# Patient Record
Sex: FEMALE | Race: WHITE | HISPANIC OR LATINO | Employment: OTHER | URBAN - METROPOLITAN AREA
[De-identification: names, ages, dates, MRNs, and addresses within clinical notes are randomized per-mention and may not be internally consistent; named-entity substitution may affect disease eponyms.]

---

## 2019-04-26 ENCOUNTER — TRANSFERRED RECORDS (OUTPATIENT)
Dept: HEALTH INFORMATION MANAGEMENT | Facility: CLINIC | Age: 29
End: 2019-04-26

## 2019-04-30 ENCOUNTER — MEDICAL CORRESPONDENCE (OUTPATIENT)
Dept: HEALTH INFORMATION MANAGEMENT | Facility: CLINIC | Age: 29
End: 2019-04-30

## 2019-07-11 ENCOUNTER — MEDICAL CORRESPONDENCE (OUTPATIENT)
Dept: HEALTH INFORMATION MANAGEMENT | Facility: CLINIC | Age: 29
End: 2019-07-11

## 2019-07-15 ENCOUNTER — TELEPHONE (OUTPATIENT)
Dept: GENETICS | Facility: CLINIC | Age: 29
End: 2019-07-15

## 2019-07-15 NOTE — TELEPHONE ENCOUNTER
"A representative from Dr. Rasmussen office in [place] left a voicemail on the referrals line asking what fax # to use to send a genetic counseling referral. Patient has multiple family members with Autism.    Called Dr. Rasmussen office back multiple times, every time the phone was answered someone said \"[facility] can you please hold\" and before I could respond I was put on hold. Waited on hold multiple times for a long period of times and no one came back on the phone.    Called patient and left a voicemail providing her with the Fax # [phone] to have the referral sent to.    There is already a referral and medical records in the chart that was sent to [phone] on 4/30/19 unsure, if this was ever addressed by the genetic counselors.  Please see scanned documents in chart.      "

## 2019-07-18 ENCOUNTER — TELEPHONE (OUTPATIENT)
Dept: CONSULT | Facility: CLINIC | Age: 29
End: 2019-07-18

## 2019-08-05 NOTE — TELEPHONE ENCOUNTER
Thea called back to schedule appointment.  Appointment is scheduled with Dr. Lock on 10/24/19.    Thanks  Chanel

## 2019-12-16 ENCOUNTER — TELEPHONE (OUTPATIENT)
Dept: CONSULT | Facility: CLINIC | Age: 29
End: 2019-12-16

## 2019-12-16 NOTE — TELEPHONE ENCOUNTER
Called Thea to discuss a question we had in preparation for her appointment with Dr. Lock in Genetics. Left my number to call me back.      Day Cohn MS, [facility]  Genetic Counseling  Genetics and Metabolism Division  [facility]   Phone: [phone]  Pager: [phone]  Email: [email or web address]

## 2020-01-22 ENCOUNTER — TELEPHONE (OUTPATIENT)
Dept: CONSULT | Facility: CLINIC | Age: 30
End: 2020-01-22

## 2020-01-22 NOTE — TELEPHONE ENCOUNTER
Called Thea RE her upcoming appointment for autism. She reports that she was diagnosed by a psychologist with autism, level 1. She agreed to email me this report. She does not have any additional medication concerns.    She reports that she has three kids with L2 and one child with L1 autism. Her son with L2 autism has a borderline IQ of 76, ODD, and PTSD. Her other children are well. None of the family have had genetic testing.    We discussed the process for genetic testing, and that we are most likely to find a genetic etiology in individuals who are more severely affected. We may therefore wish to test Thea's son first, and if positive, proceed with targeted genetic testing.    Thea was in agreement with this plan, and if Dr. Campos would like to see her son instead, she would like to reschedule the appointment to accommodate his classes. I will discuss with Dr. Campos and call Thea with a plan.      Justine Givens [facility]  Genetic Counselor   [facility]   Phone: [phone]  Pager: [phone]  Email: [email or web address]

## 2020-01-27 ENCOUNTER — TELEPHONE (OUTPATIENT)
Dept: CONSULT | Facility: CLINIC | Age: 30
End: 2020-01-27

## 2020-02-07 ENCOUNTER — TELEPHONE (OUTPATIENT)
Dept: CONSULT | Facility: CLINIC | Age: 30
End: 2020-02-07

## 2020-02-07 NOTE — TELEPHONE ENCOUNTER
Called [facility] for Thea RE scheduling her son instead of herself with Dr. Campos. We have previously talked about the benefits of this approach. Provided call center number to swap appointment. Contact info for myself provided.    Justine Givens [facility]  Genetic Counselor   [facility]   Phone: [phone]  Pager: [phone]  Email: [email or web address]

## 2022-11-12 ENCOUNTER — TRANSFERRED RECORDS (OUTPATIENT)
Dept: MULTI SPECIALTY CLINIC | Facility: CLINIC | Age: 32
End: 2022-11-12

## 2022-11-12 LAB — PAP SMEAR - HIM PATIENT REPORTED: NEGATIVE

## 2024-11-12 ENCOUNTER — OFFICE VISIT (OUTPATIENT)
Dept: FAMILY MEDICINE | Facility: CLINIC | Age: 34
End: 2024-11-12
Payer: COMMERCIAL

## 2024-11-12 VITALS
BODY MASS INDEX: 23.03 KG/M2 | OXYGEN SATURATION: 99 % | RESPIRATION RATE: 12 BRPM | HEART RATE: 74 BPM | TEMPERATURE: 98.5 F | DIASTOLIC BLOOD PRESSURE: 80 MMHG | WEIGHT: 138.2 LBS | SYSTOLIC BLOOD PRESSURE: 121 MMHG | HEIGHT: 65 IN

## 2024-11-12 DIAGNOSIS — M25.552 BILATERAL HIP PAIN: ICD-10-CM

## 2024-11-12 DIAGNOSIS — Q79.62 HYPERMOBILE EHLERS-DANLOS SYNDROME: ICD-10-CM

## 2024-11-12 DIAGNOSIS — Z00.00 ROUTINE GENERAL MEDICAL EXAMINATION AT A HEALTH CARE FACILITY: Primary | ICD-10-CM

## 2024-11-12 DIAGNOSIS — F42.2 MIXED OBSESSIONAL THOUGHTS AND ACTS: ICD-10-CM

## 2024-11-12 DIAGNOSIS — Z13.1 SCREENING FOR DIABETES MELLITUS: ICD-10-CM

## 2024-11-12 DIAGNOSIS — F84.0 AUTISM SPECTRUM DISORDER: ICD-10-CM

## 2024-11-12 DIAGNOSIS — N64.4 BREAST PAIN: ICD-10-CM

## 2024-11-12 DIAGNOSIS — G90.A POTS (POSTURAL ORTHOSTATIC TACHYCARDIA SYNDROME): ICD-10-CM

## 2024-11-12 DIAGNOSIS — M25.551 BILATERAL HIP PAIN: ICD-10-CM

## 2024-11-12 DIAGNOSIS — Z80.8 FAMILY HISTORY OF MELANOMA: ICD-10-CM

## 2024-11-12 DIAGNOSIS — Z13.220 SCREENING, LIPID: ICD-10-CM

## 2024-11-12 DIAGNOSIS — F90.9 ATTENTION DEFICIT HYPERACTIVITY DISORDER (ADHD), UNSPECIFIED ADHD TYPE: ICD-10-CM

## 2024-11-12 DIAGNOSIS — R11.0 NAUSEA: ICD-10-CM

## 2024-11-12 LAB
ANION GAP SERPL CALCULATED.3IONS-SCNC: 11 MMOL/L (ref 7–15)
BASOPHILS # BLD AUTO: 0.1 10E3/UL (ref 0–0.2)
BASOPHILS NFR BLD AUTO: 1 %
BUN SERPL-MCNC: 10 MG/DL (ref 6–20)
CALCIUM SERPL-MCNC: 9 MG/DL (ref 8.8–10.4)
CHLORIDE SERPL-SCNC: 104 MMOL/L (ref 98–107)
CHOLEST SERPL-MCNC: 172 MG/DL
CREAT SERPL-MCNC: 0.77 MG/DL (ref 0.51–0.95)
EGFRCR SERPLBLD CKD-EPI 2021: >90 ML/MIN/1.73M2
EOSINOPHIL # BLD AUTO: 0.2 10E3/UL (ref 0–0.7)
EOSINOPHIL NFR BLD AUTO: 2 %
ERYTHROCYTE [DISTWIDTH] IN BLOOD BY AUTOMATED COUNT: 12.3 % (ref 10–15)
FASTING STATUS PATIENT QL REPORTED: YES
FASTING STATUS PATIENT QL REPORTED: YES
GLUCOSE SERPL-MCNC: 87 MG/DL (ref 70–99)
HCO3 SERPL-SCNC: 24 MMOL/L (ref 22–29)
HCT VFR BLD AUTO: 39.5 % (ref 35–47)
HDLC SERPL-MCNC: 92 MG/DL
HGB BLD-MCNC: 13 G/DL (ref 11.7–15.7)
IMM GRANULOCYTES # BLD: 0 10E3/UL
IMM GRANULOCYTES NFR BLD: 0 %
LDLC SERPL CALC-MCNC: 65 MG/DL
LYMPHOCYTES # BLD AUTO: 2 10E3/UL (ref 0.8–5.3)
LYMPHOCYTES NFR BLD AUTO: 23 %
MCH RBC QN AUTO: 29.1 PG (ref 26.5–33)
MCHC RBC AUTO-ENTMCNC: 32.9 G/DL (ref 31.5–36.5)
MCV RBC AUTO: 89 FL (ref 78–100)
MONOCYTES # BLD AUTO: 0.4 10E3/UL (ref 0–1.3)
MONOCYTES NFR BLD AUTO: 5 %
NEUTROPHILS # BLD AUTO: 6.1 10E3/UL (ref 1.6–8.3)
NEUTROPHILS NFR BLD AUTO: 69 %
NONHDLC SERPL-MCNC: 80 MG/DL
PLATELET # BLD AUTO: 204 10E3/UL (ref 150–450)
POTASSIUM SERPL-SCNC: 4.1 MMOL/L (ref 3.4–5.3)
RBC # BLD AUTO: 4.46 10E6/UL (ref 3.8–5.2)
SODIUM SERPL-SCNC: 139 MMOL/L (ref 135–145)
TRIGL SERPL-MCNC: 76 MG/DL
TSH SERPL DL<=0.005 MIU/L-ACNC: 1.4 UIU/ML (ref 0.3–4.2)
WBC # BLD AUTO: 8.8 10E3/UL (ref 4–11)

## 2024-11-12 PROCEDURE — 80048 BASIC METABOLIC PNL TOTAL CA: CPT | Performed by: PHYSICIAN ASSISTANT

## 2024-11-12 PROCEDURE — 85025 COMPLETE CBC W/AUTO DIFF WBC: CPT | Performed by: PHYSICIAN ASSISTANT

## 2024-11-12 PROCEDURE — 80061 LIPID PANEL: CPT | Performed by: PHYSICIAN ASSISTANT

## 2024-11-12 PROCEDURE — 84443 ASSAY THYROID STIM HORMONE: CPT | Performed by: PHYSICIAN ASSISTANT

## 2024-11-12 PROCEDURE — 99385 PREV VISIT NEW AGE 18-39: CPT | Performed by: PHYSICIAN ASSISTANT

## 2024-11-12 PROCEDURE — 36415 COLL VENOUS BLD VENIPUNCTURE: CPT | Performed by: PHYSICIAN ASSISTANT

## 2024-11-12 PROCEDURE — 99214 OFFICE O/P EST MOD 30 MIN: CPT | Mod: 25 | Performed by: PHYSICIAN ASSISTANT

## 2024-11-12 RX ORDER — NORELGESTROMIN AND ETHINYL ESTRADIOL 35; 150 UG/MG; UG/MG
1 PATCH TRANSDERMAL WEEKLY
COMMUNITY
Start: 2024-10-08

## 2024-11-12 RX ORDER — LISDEXAMFETAMINE DIMESYLATE 30 MG/1
30 CAPSULE ORAL DAILY
Qty: 30 CAPSULE | Refills: 0 | Status: SHIPPED | OUTPATIENT
Start: 2024-12-12 | End: 2025-01-11

## 2024-11-12 RX ORDER — LISDEXAMFETAMINE DIMESYLATE 30 MG/1
30 CAPSULE ORAL DAILY
Qty: 30 CAPSULE | Refills: 0 | Status: SHIPPED | OUTPATIENT
Start: 2024-11-12 | End: 2024-12-12

## 2024-11-12 RX ORDER — LISDEXAMFETAMINE DIMESYLATE 30 MG/1
30 CAPSULE ORAL DAILY
Qty: 30 CAPSULE | Refills: 0 | Status: SHIPPED | OUTPATIENT
Start: 2025-01-11 | End: 2025-02-10

## 2024-11-12 SDOH — HEALTH STABILITY: PHYSICAL HEALTH: ON AVERAGE, HOW MANY MINUTES DO YOU ENGAGE IN EXERCISE AT THIS LEVEL?: 30 MIN

## 2024-11-12 SDOH — HEALTH STABILITY: PHYSICAL HEALTH: ON AVERAGE, HOW MANY DAYS PER WEEK DO YOU ENGAGE IN MODERATE TO STRENUOUS EXERCISE (LIKE A BRISK WALK)?: 3 DAYS

## 2024-11-12 ASSESSMENT — SOCIAL DETERMINANTS OF HEALTH (SDOH): HOW OFTEN DO YOU GET TOGETHER WITH FRIENDS OR RELATIVES?: NEVER

## 2024-11-12 NOTE — PATIENT INSTRUCTIONS
Patient Education   Preventive Care Advice   This is general advice given by our system to help you stay healthy. However, your care team may have specific advice just for you. Please talk to your care team about your preventive care needs.  Nutrition  Eat 5 or more servings of fruits and vegetables each day.  Try wheat bread, brown rice and whole grain pasta (instead of white bread, rice, and pasta).  Get enough calcium and vitamin D. Check the label on foods and aim for 100% of the RDA (recommended daily allowance).  Lifestyle  Exercise at least 150 minutes each week  (30 minutes a day, 5 days a week).  Do muscle strengthening activities 2 days a week. These help control your weight and prevent disease.  No smoking.  Wear sunscreen to prevent skin cancer.  Have a dental exam and cleaning every 6 months.  Yearly exams  See your health care team every year to talk about:  Any changes in your health.  Any medicines your care team has prescribed.  Preventive care, family planning, and ways to prevent chronic diseases.  Shots (vaccines)   HPV shots (up to age 26), if you've never had them before.  Hepatitis B shots (up to age 59), if you've never had them before.  COVID-19 shot: Get this shot when it's due.  Flu shot: Get a flu shot every year.  Tetanus shot: Get a tetanus shot every 10 years.  Pneumococcal, hepatitis A, and RSV shots: Ask your care team if you need these based on your risk.  Shingles shot (for age 50 and up)  General health tests  Diabetes screening:  Starting at age 35, Get screened for diabetes at least every 3 years.  If you are younger than age 35, ask your care team if you should be screened for diabetes.  Cholesterol test: At age 39, start having a cholesterol test every 5 years, or more often if advised.  Bone density scan (DEXA): At age 50, ask your care team if you should have this scan for osteoporosis (brittle bones).  Hepatitis C: Get tested at least once in your life.  STIs (sexually  transmitted infections)  Before age 24: Ask your care team if you should be screened for STIs.  After age 24: Get screened for STIs if you're at risk. You are at risk for STIs (including HIV) if:  You are sexually active with more than one person.  You don't use condoms every time.  You or a partner was diagnosed with a sexually transmitted infection.  If you are at risk for HIV, ask about PrEP medicine to prevent HIV.  Get tested for HIV at least once in your life, whether you are at risk for HIV or not.  Cancer screening tests  Cervical cancer screening: If you have a cervix, begin getting regular cervical cancer screening tests starting at age 21.  Breast cancer scan (mammogram): If you've ever had breasts, begin having regular mammograms starting at age 40. This is a scan to check for breast cancer.  Colon cancer screening: It is important to start screening for colon cancer at age 45.  Have a colonoscopy test every 10 years (or more often if you're at risk) Or, ask your provider about stool tests like a FIT test every year or Cologuard test every 3 years.  To learn more about your testing options, visit:   .  For help making a decision, visit:   https://bit.ly/rw78777.  Prostate cancer screening test: If you have a prostate, ask your care team if a prostate cancer screening test (PSA) at age 55 is right for you.  Lung cancer screening: If you are a current or former smoker ages 50 to 80, ask your care team if ongoing lung cancer screenings are right for you.  For informational purposes only. Not to replace the advice of your health care provider. Copyright   2023 [facility]. All rights reserved. Clinically reviewed by the [facility]. [employer] 279081 - REV 01/24.  Substance Use Disorder: Care Instructions  Overview     You can improve your life and health by stopping your use of alcohol or drugs. When you don't drink or use drugs, you may feel and sleep better. You may  get along better with your family, friends, and coworkers. There are medicines and programs that can help with substance use disorder.  How can you care for yourself at home?  Here are some ways to help you stay sober and prevent relapse.  If you have been given medicine to help keep you sober or reduce your cravings, be sure to take it exactly as prescribed.  Talk to your doctor about programs that can help you stop using drugs or drinking alcohol.  Do not keep alcohol or drugs in your home.  Plan ahead. Think about what you'll say if other people ask you to drink or use drugs. Try not to spend time with people who drink or use drugs.  Use the time and money spent on drinking or drugs to do something that's important to you.  Preventing a relapse  Have a plan to deal with relapse. Learn to recognize changes in your thinking that lead you to drink or use drugs. Get help before you start to drink or use drugs again.  Try to stay away from situations, friends, or places that may lead you to drink or use drugs.  If you feel the need to drink alcohol or use drugs again, seek help right away. Call a trusted friend or family member. Some people get support from organizations such as Narcotics Anonymous or [employer] or from treatment facilities.  If you relapse, get help as soon as you can. Some people make a plan with another person that outlines what they want that person to do for them if they relapse. The plan usually includes how to handle the relapse and who to notify in case of relapse.  Don't give up. Remember that a relapse doesn't mean that you have failed. Use the experience to learn the triggers that lead you to drink or use drugs. Then quit again. Recovery is a lifelong process. Many people have several relapses before they are able to quit for good.  Follow-up care is a key part of your treatment and safety. Be sure to make and go to all appointments, and call your doctor if you are having problems. It's  "also a good idea to know your test results and keep a list of the medicines you take.  When should you call for help?   Call 911  anytime you think you may need emergency care. For example, call if you or someone else:    Has overdosed or has withdrawal signs. Be sure to tell the emergency workers that you are or someone else is using or trying to quit using drugs. Overdose or withdrawal signs may include:  Losing consciousness.  Seizure.  Seeing or hearing things that aren't there (hallucinations).     Is thinking or talking about suicide or harming others.   Where to get help 24 hours a day, 7 days a week   If you or someone you know talks about suicide, self-harm, a mental health crisis, a substance use crisis, or any other kind of emotional distress, get help right away. You can:    Call the Suicide and Crisis Lifeline at 988.     Call 9-355-477-TALK (1-589.870.5931).     Text HOME to 948951 to access the Crisis Text Line.   Consider saving these numbers in your phone.  Go to [employer] for more information or to chat online.  Call your doctor now or seek immediate medical care if:    You are having withdrawal symptoms. These may include nausea or vomiting, sweating, shakiness, and anxiety.   Watch closely for changes in your health, and be sure to contact your doctor if:    You have a relapse.     You need more help or support to stop.   Where can you learn more?  Go to https://www.[employer].net/patiented  Enter H573 in the search box to learn more about \"Substance Use Disorder: Care Instructions.\"  Current as of: November 15, 2023  Content Version: 14.2 2024 [facility].   Care instructions adapted under license by your healthcare professional. If you have questions about a medical condition or this instruction, always ask your healthcare professional. [employer] disclaims any warranty or liability for your use of this information.       "

## 2024-11-12 NOTE — Clinical Note
Please abstract the following data from this visit with this patient into the appropriate field in Epic:  Tests that can be patient reported without a hard copy:  Pap smear done on this date: 11- (approximately), by this group: [facility], results were normal/negative.   Other Tests found in the patient's chart through Chart Review/Care Everywhere:  {Abstract Quality List (Optional):363083}  Note to Abstraction: If this section is blank, no results were found via Chart Review/Care Everywhere.

## 2024-11-12 NOTE — PROGRESS NOTES
Preventive Care Visit  [facility] DALY Evans PA-C, Family Medicine  Nov 12, 2024      Assessment & Plan     Routine general medical examination at a health care facility      Nausea  Since she was younger, could be from ocd/anxiety or GI related, never seen by gi will refer also has some pains before have a BM  - TSH with free T4 reflex; Future  - Adult GI  Referral - Consult Only; Future  - CBC with platelets and differential; Future  - TSH with free T4 reflex  - CBC with platelets and differential    Attention deficit hyperactivity disorder (ADHD), unspecified ADHD type  Improved  Recheck 3 months to go over contract  - lisdexamfetamine (VYVANSE) 30 MG capsule; Take 1 capsule (30 mg) by mouth daily.  - lisdexamfetamine (VYVANSE) 30 MG capsule; Take 1 capsule (30 mg) by mouth daily.  - lisdexamfetamine (VYVANSE) 30 MG capsule; Take 1 capsule (30 mg) by mouth daily.    Bilateral hip pain  Referred was seeing ortho in [place]  - Orthopedic  Referral; Future    Autism spectrum disorder      Hypermobile Haja-Danlos syndrome      Mixed obsessional thoughts and acts      Breast pain  Had a lumpectomy previously per patient but still has pain there, referred to breast surgeon. She is wondering if pain is related to marker they put in right breast  - Adult Gen Surg  Referral; Future    Family history of melanoma  Needs derm has not had full exam in years  - Adult Dermatology  Referral; Future    Screening, lipid    - Lipid panel reflex to direct LDL Fasting; Future  - Lipid panel reflex to direct LDL Fasting    Screening for diabetes mellitus    - Basic metabolic panel  (Ca, Cl, CO2, Creat, Gluc, K, Na, BUN); Future  - Basic metabolic panel  (Ca, Cl, CO2, Creat, Gluc, K, Na, BUN)    POTS (postural orthostatic tachycardia syndrome)  Feels better with stimulant  Referred to cardiology, previously had infusions          Counseling  Appropriate preventive  services were addressed with this patient via screening, questionnaire, or discussion as appropriate for fall prevention, nutrition, physical activity, Tobacco-use cessation, social engagement, weight loss and cognition.  Checklist reviewing preventive services available has been given to the patient.  Reviewed patient's diet, addressing concerns and/or questions.   She is at risk for lack of exercise and has been provided with information to increase physical activity for the benefit of her well-being.   Patient is at risk for social isolation and has been provided with information about the benefit of social connection.   The patient was instructed to see the dentist every 6 months.         Rafi Michael is a 34 year old, presenting for the following:  Physical and Establish Care        11/12/2024     9:17 AM   Additional Questions   Roomed by Deepti FLETCHER CMA   Accompanied by alone         11/12/2024     9:17 AM   Patient Reported Additional Medications   Patient reports taking the following new medications norelgestromin-ethinyl estradiol (Zafemy) 150-35 MCG/24HR PATCH WEEKLY , vyvanse 30mg          HPI    New patient to me here to establish care.   Pap- leep age 17. Since then paps have been normal. Last one was about 2 years ago through [facility] and was normal abstract.     Chronic conditions:    SH: 4 children disabled autism, adhd, cognitive issues-stays at home and takes care of all of them. Moved to [facility] for better schools.     Zafemy patches-helped with menorrhgia.     Adhd and autism spectrum disorder diagnosed through [facility]-seen through [facility] most recently, records in care everywhere.   Vyvanse 30 mg last filled in april 30 mg 30 capsules dispensed. She has not been taking as she moved from [place] to here and hadn't established yet.   [place].   Without medication she is more scatterbrained. Can be irritible while taking it. For her ocd she has not tried  medication due to having side effects with several meds.   previously used medical thc but now just otc when it became legal.    H/o pots and was getting infusions for this through specialist that were helpful.     Hypermobility-saw a specialist previously.     ERICKSON in hips and labral tear-saw seeing ortho for this in [place]. Would like to see one here.     Health Care Directive  Patient does not have a Health Care Directive:       11/12/2024   General Health   How would you rate your overall physical health? (!) FAIR   Feel stress (tense, anxious, or unable to sleep) To some extent      (!) STRESS CONCERN      11/12/2024   Nutrition   Three or more servings of calcium each day? (!) I DON'T KNOW   Diet: Other   If other, please elaborate: need more salt and water due to POTS   How many servings of fruit and vegetables per day? (!) 0-1   How many sweetened beverages each day? (!) 2            11/12/2024   Exercise   Days per week of moderate/strenous exercise 3 days   Average minutes spent exercising at this level 30 min            11/12/2024   Social Factors   Frequency of gathering with friends or relatives Never   Worry food won't last until get money to buy more No   Food not last or not have enough money for food? No   Do you have housing? (Housing is defined as stable permanent housing and does not include staying ouside in a car, in a tent, in an abandoned building, in an overnight shelter, or couch-surfing.) Yes   Are you worried about losing your housing? No   Lack of transportation? No   Unable to get utilities (heat,electricity)? No      (!) SOCIAL CONNECTIONS CONCERN      11/12/2024   Dental   Dentist two times every year? (!) NO            11/12/2024   TB Screening   Were you born outside of the [place]? No            Today's PHQ-2 Score:       11/12/2024     9:20 AM   PHQ-2 ( 1999 [employer])   Q1: Little interest or pleasure in doing things 1    Q2: Feeling down, depressed or hopeless 0    PHQ-2 Score 1    Q1:  "Little interest or pleasure in doing things Several days   Q2: Feeling down, depressed or hopeless Not at all   PHQ-2 Score 1       Patient-reported           11/12/2024   Substance Use   Alcohol more than 3/day or more than 7/wk Not Applicable   Do you use any other substances recreationally? (!) CANNABIS PRODUCTS        Social History     Tobacco Use    Smoking status: Never    Smokeless tobacco: Never   Vaping Use    Vaping status: Never Used               11/12/2024   One time HIV Screening   Previous HIV test? I don't know          11/12/2024   STI Screening   New sexual partner(s) since last STI/HIV test? No        History of abnormal Pap smear: No - age 30- 64 PAP with HPV every 5 years recommended             11/12/2024   Contraception/Family Planning   Questions about contraception or family planning No           Reviewed and updated as needed this visit by Provider                         Objective    Exam  /80   Pulse 74   Temp 98.5  F (36.9  C) (Temporal)   Resp 12   Ht 1.64 m (5' 4.57\")   Wt 62.7 kg (138 lb 3.2 oz)   LMP 10/12/2024 (Within Days)   SpO2 99%   BMI 23.31 kg/m     Estimated body mass index is 23.31 kg/m  as calculated from the following:    Height as of this encounter: 1.64 m (5' 4.57\").    Weight as of this encounter: 62.7 kg (138 lb 3.2 oz).    Physical Exam  GENERAL: alert and no distress  EYES: Eyes grossly normal to inspection, PERRL and conjunctivae and sclerae normal  HENT: ear canals and TM's normal, nose and mouth without ulcers or lesions  NECK: no adenopathy, no asymmetry, masses, or scars  RESP: lungs clear to auscultation - no rales, rhonchi or wheezes  CV: regular rate and rhythm, normal S1 S2, no S3 or S4, no murmur, click or rub, no peripheral edema  ABDOMEN: soft, nontender, no hepatosplenomegaly, no masses and bowel sounds normal  MS: no gross musculoskeletal defects noted, no edema  SKIN: no suspicious lesions or rashes  NEURO: Normal strength and tone, " mentation intact and speech normal  PSYCH: mentation appears normal, affect normal/bright        Signed Electronically by: Kathryn Evans PA-C

## 2024-11-14 ENCOUNTER — TELEPHONE (OUTPATIENT)
Dept: GASTROENTEROLOGY | Facility: CLINIC | Age: 34
End: 2024-11-14

## 2024-11-14 NOTE — TELEPHONE ENCOUNTER
[facility] Call Center    Phone Message    May a detailed message be left on voicemail: Yes    Reason for Call: Other: Patient is currently scheduled on 01/23, as visit type New GI Urgent. This is outside the expected timeline for this referral. Patient has been added to the waitlist.      Action Taken: Message routed to:  Other: GI REFERRAL TRIAGE POOL     Travel Screening: Not Applicable

## 2024-11-14 NOTE — RESULT ENCOUNTER NOTE
Daniel Sidhu,       Your recent test results are attached, if you have any questions or concerns please feel free to contact me via e-mail or call [phone].  Cholesterol is to goal for you. Sodium and potassium normal. Blood sugar (glucose) normal.  Creatinine and GFR normal, which means kidney function is normal.  Thyroid hormone is normal.  White and red blood cell counts are normal.  No anemia.           It was a pleasure to see you at your recent office visit.      Sincerely,  Kathryn Evans PA-C

## 2024-11-26 NOTE — TELEPHONE ENCOUNTER
Clinic Navigator sent a [facility] message to inform the Pt that Pt is on the wait list for a sooner appointment. Clinic Navigator will reach out to the Pt via phone call or [employer] when a sooner appointment becomes available.

## 2024-12-23 NOTE — TELEPHONE ENCOUNTER
Patient is now scheduled within the appropriate time frame of one month for urgent triaged referrals. No rescheduling is needed anymore.    Scheduled with Gastroenterology (Chinmay Almanzar PA-C)  01/23/2025 at 3:00 PM     Closing encounter.      Juliet Rubin, [facility]

## 2025-01-23 ENCOUNTER — OFFICE VISIT (OUTPATIENT)
Dept: GASTROENTEROLOGY | Facility: CLINIC | Age: 35
End: 2025-01-23
Attending: PHYSICIAN ASSISTANT
Payer: COMMERCIAL

## 2025-01-23 VITALS
OXYGEN SATURATION: 99 % | SYSTOLIC BLOOD PRESSURE: 137 MMHG | BODY MASS INDEX: 21.81 KG/M2 | HEART RATE: 79 BPM | DIASTOLIC BLOOD PRESSURE: 80 MMHG | HEIGHT: 65 IN | WEIGHT: 130.9 LBS

## 2025-01-23 DIAGNOSIS — R11.0 NAUSEA: ICD-10-CM

## 2025-01-23 DIAGNOSIS — R10.84 ABDOMINAL PAIN, GENERALIZED: Primary | ICD-10-CM

## 2025-01-23 DIAGNOSIS — R19.4 ALTERED BOWEL HABITS: ICD-10-CM

## 2025-01-23 LAB
ALBUMIN SERPL BCG-MCNC: 4.3 G/DL (ref 3.5–5.2)
ALP SERPL-CCNC: 101 U/L (ref 40–150)
ALT SERPL W P-5'-P-CCNC: 14 U/L (ref 0–50)
AST SERPL W P-5'-P-CCNC: 16 U/L (ref 0–45)
BILIRUB DIRECT SERPL-MCNC: <0.2 MG/DL (ref 0–0.3)
BILIRUB SERPL-MCNC: 0.3 MG/DL
CRP SERPL-MCNC: 12.4 MG/L
LIPASE SERPL-CCNC: 27 U/L (ref 13–60)
PROT SERPL-MCNC: 7.1 G/DL (ref 6.4–8.3)

## 2025-01-23 ASSESSMENT — PAIN SCALES - GENERAL: PAINLEVEL_OUTOF10: NO PAIN (0)

## 2025-01-23 NOTE — PROGRESS NOTES
NEW PATIENT GI CLINIC VISIT    CC/REFERRING MD:  Kathryn Evans  REASON FOR CONSULTATION:   Kathryn Evans for   Chief Complaint   Patient presents with    New Patient     New consult for nausea, abdominal cramping, alternating constipation and diarrhea.       ASSESSMENT/PLAN: Patient is here for evaluation regarding longstanding upper and lower GI distress symptoms.  We spent some time reviewing potential etiologies.  With reports of chronic nausea, previous history of vomiting, early satiety, and abdominal pains, we would think about gastritis/peptic ulcer disease, reflux esophagitis, EOE, gastroparesis, gastric outlet obstruction, biliary colic, pancreatitis, cyclic vomiting syndrome/cannabis hyperemesis, among others.  Regarding the fluctuating stools, we discussed inflammatory bowel disease, irritable bowel syndrome, celiac disease.  Plan for laboratory workup and imaging as detailed below to start investigating for the symptoms.  She has failed empiric therapies in the past and would prefer to wait on any specific therapies for symptoms now, which is reasonable, as they have been stable.  We will follow-up with her after labs and imaging are completed.    1. Nausea  - Adult GI  Referral - Consult Only  - NM Gastric Emptying; Future  - XR Abdomen 2 Views; Future  - CRP, inflammation; Future  - Calprotectin Feces; Future  - Tissue transglutaminase joy IgA and IgG; Future  - IgA; Future  - Hepatic panel (Albumin, ALT, AST, Bili, Alk Phos, TP); Future  - Lipase; Future  - Calprotectin Feces  - CRP, inflammation  - Tissue transglutaminase joy IgA and IgG  - IgA  - Hepatic panel (Albumin, ALT, AST, Bili, Alk Phos, TP)  - Lipase    2. Abdominal pain, generalized (Primary)  - NM Gastric Emptying; Future  - XR Abdomen 2 Views; Future  - CRP, inflammation; Future  - Calprotectin Feces; Future  - Tissue transglutaminase joy IgA and IgG; Future  - IgA; Future  - Hepatic panel (Albumin, ALT, AST,  Bili, Alk Phos, TP); Future  - Lipase; Future  - Calprotectin Feces  - CRP, inflammation  - Tissue transglutaminase joy IgA and IgG  - IgA  - Hepatic panel (Albumin, ALT, AST, Bili, Alk Phos, TP)  - Lipase    3. Altered bowel habits  - NM Gastric Emptying; Future  - XR Abdomen 2 Views; Future  - CRP, inflammation; Future  - Calprotectin Feces; Future  - Tissue transglutaminase joy IgA and IgG; Future  - IgA; Future  - Hepatic panel (Albumin, ALT, AST, Bili, Alk Phos, TP); Future  - Lipase; Future  - Calprotectin Feces  - CRP, inflammation  - Tissue transglutaminase joy IgA and IgG  - IgA  - Hepatic panel (Albumin, ALT, AST, Bili, Alk Phos, TP)  - Lipase      Thank you for this consultation.  It was a pleasure to participate in the care of this patient; please contact us with any further questions.      35 minutes spent on the date of the encounter doing chart review, patient visit, and documentation    This note was created with voice recognition software, and while reviewed for accuracy, typos may remain.     Chinmay Almanzar PA-C  Division of Gastroenterology, Hepatology and Nutrition  [facility]  Thea Draper is a 34 year old female with a past medical history significant for postural orthostatic tachycardia syndrome, hypermobile Haja-Danlos syndrome, ADHD, autism spectrum disorder, recently moved to the [facility] from [place] that is seen as a new patient in the GI clinic today for longstanding history of digestive distress symptoms.  She recalls initially having digestive issues starting in childhood and these have progressed into adulthood.  She describes chronic nausea, which is essentially persistent, along with spells of generalized abdominal pain and cramping, as well as fluctuating bowel habits between diarrhea and constipation.  She can wake up with nausea and it can fluctuate throughout the day, not necessarily worse or better after  eating.  Spells of abdominal pain are generally not prompted by eating or drinking, seem to occur randomly, described as sharp in nature.  She tends to have more frequent loose stools, but can also pass hard, dry stool at times.  Usually has BM daily, but occasionally will skip days.  No blood in the stool but has seen mucus.  She previously had issues with vomiting, but as of now, it is really only the nausea.  She does have some early satiety with meals.  There is no heartburn, reflux, dysphagia, or regurgitation.  She has trialed antiemetics and antacids in the past without any relief.  She eats an average diet, does not avoid any foods and is not aware of any specific dietary triggers that make her symptoms worse.    Surgical history is pertinent for  section x 3.  She is unaware of any specific family medical history of digestive diseases.  There is no tobacco or alcohol use, but she does use cannabis, has been using since the age of 11.    She has not had EGD or colonoscopy before.  She does have a noncontrast enhanced CT abdomen and pelvis from a couple of years ago which does not show any abnormal findings.  Current medications include transdermal combined contraceptive patch, Vyvanse.  No OTC meds or NSAIDs.    ROS:    10 point ROS neg other than the symptoms noted above in the HPI.    PREVIOUS ENDOSCOPY:  None    PERTINENT RELEVANT IMAGING OR LABS:  Reviewed    Office Visit on 2024   Component Date Value Ref Range Status    Cholesterol 2024 172  <200 mg/dL Final    Triglycerides 2024 76  <150 mg/dL Final    Direct Measure HDL 2024 92  >=50 mg/dL Final    LDL Cholesterol Calculated 2024 65  <100 mg/dL Final    Non HDL Cholesterol 2024 80  <130 mg/dL Final    Patient Fasting > 8hrs? 2024 Yes   Final    Sodium 2024 139  135 - 145 mmol/L Final    Potassium 2024 4.1  3.4 - 5.3 mmol/L Final    Chloride 2024 104  98 - 107 mmol/L Final    Carbon  Dioxide (CO2) 11/12/2024 24  22 - 29 mmol/L Final    Anion Gap 11/12/2024 11  7 - 15 mmol/L Final    Urea Nitrogen 11/12/2024 10.0  6.0 - 20.0 mg/dL Final    Creatinine 11/12/2024 0.77  0.51 - 0.95 mg/dL Final    GFR Estimate 11/12/2024 >90  >60 mL/min/1.73m2 Final    eGFR calculated using 2021 CKD-EPI equation.    Calcium 11/12/2024 9.0  8.8 - 10.4 mg/dL Final    Reference intervals for this test were updated on 7/16/2024 to reflect our healthy population more accurately. There may be differences in the flagging of prior results with similar values performed with this method. Those prior results can be interpreted in the context of the updated reference intervals.    Glucose 11/12/2024 87  70 - 99 mg/dL Final    Patient Fasting > 8hrs? 11/12/2024 Yes   Final    TSH 11/12/2024 1.40  0.30 - 4.20 uIU/mL Final    WBC Count 11/12/2024 8.8  4.0 - 11.0 10e3/uL Final    RBC Count 11/12/2024 4.46  3.80 - 5.20 10e6/uL Final    Hemoglobin 11/12/2024 13.0  11.7 - 15.7 g/dL Final    Hematocrit 11/12/2024 39.5  35.0 - 47.0 % Final    MCV 11/12/2024 89  78 - 100 fL Final    MCH 11/12/2024 29.1  26.5 - 33.0 pg Final    MCHC 11/12/2024 32.9  31.5 - 36.5 g/dL Final    RDW 11/12/2024 12.3  10.0 - 15.0 % Final    Platelet Count 11/12/2024 204  150 - 450 10e3/uL Final    % Neutrophils 11/12/2024 69  % Final    % Lymphocytes 11/12/2024 23  % Final    % Monocytes 11/12/2024 5  % Final    % Eosinophils 11/12/2024 2  % Final    % Basophils 11/12/2024 1  % Final    % Immature Granulocytes 11/12/2024 0  % Final    Absolute Neutrophils 11/12/2024 6.1  1.6 - 8.3 10e3/uL Final    Absolute Lymphocytes 11/12/2024 2.0  0.8 - 5.3 10e3/uL Final    Absolute Monocytes 11/12/2024 0.4  0.0 - 1.3 10e3/uL Final    Absolute Eosinophils 11/12/2024 0.2  0.0 - 0.7 10e3/uL Final    Absolute Basophils 11/12/2024 0.1  0.0 - 0.2 10e3/uL Final    Absolute Immature Granulocytes 11/12/2024 0.0  <=0.4 10e3/uL Final         ALLERGIES:     Allergies   Allergen  Reactions    Latex Unknown     Itchy and rash       PERTINENT MEDICATIONS:    Current Outpatient Medications:     lisdexamfetamine (VYVANSE) 30 MG capsule, Take 1 capsule (30 mg) by mouth daily., Disp: 30 capsule, Rfl: 0    norelgestromin-ethinyl estradiol (ZAFEMY) 150-35 MCG/24HR patch, Place 1 patch onto the skin once a week., Disp: , Rfl:     PROBLEM LIST  Patient Active Problem List    Diagnosis Date Noted    Nausea 2024     Priority: Medium    Bilateral hip pain 2024     Priority: Medium    Attention deficit hyperactivity disorder (ADHD), unspecified ADHD type 2024     Priority: Medium    Autism spectrum disorder 2024     Priority: Medium    Hypermobile Haja-Danlos syndrome 2024     Priority: Medium    Mixed obsessional thoughts and acts 2024     Priority: Medium    Family history of melanoma 2024     Priority: Medium    Breast pain 2024     Priority: Medium    POTS (postural orthostatic tachycardia syndrome) 2022     Priority: Medium       PERTINENT PAST MEDICAL HISTORY:  No past medical history on file.    PREVIOUS SURGERIES:  Past Surgical History:   Procedure Laterality Date     SECTION      x3    OTHER SURGICAL HISTORY      r breast benign tumor removed, has a maker here.       SOCIAL HISTORY:  Social History     Socioeconomic History    Marital status: Single     Spouse name: Not on file    Number of children: Not on file    Years of education: Not on file    Highest education level: Not on file   Occupational History    Not on file   Tobacco Use    Smoking status: Never    Smokeless tobacco: Never   Vaping Use    Vaping status: Never Used   Substance and Sexual Activity    Alcohol use: Not Currently    Drug use: Yes     Types: Marijuana    Sexual activity: Not on file   Other Topics Concern    Not on file   Social History Narrative    Not on file     Social Drivers of Health     Financial Resource Strain: Low Risk  (2024)    Financial  Resource Strain     Within the past 12 months, have you or your family members you live with been unable to get utilities (heat, electricity) when it was really needed?: No   Food Insecurity: Low Risk  (11/12/2024)    Food Insecurity     Within the past 12 months, did you worry that your food would run out before you got money to buy more?: No     Within the past 12 months, did the food you bought just not last and you didn t have money to get more?: No   Transportation Needs: Low Risk  (11/12/2024)    Transportation Needs     Within the past 12 months, has lack of transportation kept you from medical appointments, getting your medicines, non-medical meetings or appointments, work, or from getting things that you need?: No   Physical Activity: Insufficiently Active (11/12/2024)    Exercise Vital Sign     Days of Exercise per Week: 3 days     Minutes of Exercise per Session: 30 min   Stress: Stress Concern Present (11/12/2024)    [employer] - Occupational Stress Questionnaire     Feeling of Stress : To some extent   Social Connections: Unknown (11/12/2024)    Social Connection and Isolation Panel [NHANES]     Frequency of Communication with Friends and Family: Not on file     Frequency of Social Gatherings with Friends and Family: Never     Attends Mosque Services: Not on file     Active Member of Clubs or Organizations: Not on file     Attends Club or Organization Meetings: Not on file     Marital Status: Not on file   Interpersonal Safety: Low Risk  (11/12/2024)    Interpersonal Safety     Do you feel physically and emotionally safe where you currently live?: Yes     Within the past 12 months, have you been hit, slapped, kicked or otherwise physically hurt by someone?: No     Within the past 12 months, have you been humiliated or emotionally abused in other ways by your partner or ex-partner?: No   Housing Stability: Low Risk  (11/12/2024)    Housing Stability     Do you have  "housing? : Yes     Are you worried about losing your housing?: No       FAMILY HISTORY:  Family History   Problem Relation Age of Onset    Melanoma Mother     Breast Cancer No family hx of     Colon Cancer No family hx of        Past/family/social history reviewed and no changes    PHYSICAL EXAMINATION:  Constitutional: aaox3, cooperative, pleasant, not dyspneic/diaphoretic, no acute distress  Vitals reviewed: /80 (BP Location: Left arm, Patient Position: Sitting, Cuff Size: Adult Regular)   Pulse 79   Ht 1.638 m (5' 4.5\")   Wt 59.4 kg (130 lb 14.4 oz)   SpO2 99%   BMI 22.12 kg/m    Wt:   Wt Readings from Last 2 Encounters:   01/23/25 59.4 kg (130 lb 14.4 oz)   11/12/24 62.7 kg (138 lb 3.2 oz)      Eyes: Sclera anicteric/injected  CV: No edema  Respiratory: Unlabored breathing  Abd: Nondistended, +bs, no hepatosplenomegaly, nontender, no peritoneal signs  Skin: warm, perfused, no jaundice  Psych: Normal affect  MSK: Normal gait                    "

## 2025-01-23 NOTE — LETTER
1/23/2025      Thea Draper  [address] [facility] [id number]      Dear Colleague,    Thank you for referring your patient, Thea Draper, to the [facility]. Please see a copy of my visit note below.    NEW PATIENT GI CLINIC VISIT    CC/REFERRING MD:  Kathryn Evans  REASON FOR CONSULTATION:   Kathryn Evans for   Chief Complaint   Patient presents with     New Patient     New consult for nausea, abdominal cramping, alternating constipation and diarrhea.       ASSESSMENT/PLAN: Patient is here for evaluation regarding longstanding upper and lower GI distress symptoms.  We spent some time reviewing potential etiologies.  With reports of chronic nausea, previous history of vomiting, early satiety, and abdominal pains, we would think about gastritis/peptic ulcer disease, reflux esophagitis, EOE, gastroparesis, gastric outlet obstruction, biliary colic, pancreatitis, cyclic vomiting syndrome/cannabis hyperemesis, among others.  Regarding the fluctuating stools, we discussed inflammatory bowel disease, irritable bowel syndrome, celiac disease.  Plan for laboratory workup and imaging as detailed below to start investigating for the symptoms.  She has failed empiric therapies in the past and would prefer to wait on any specific therapies for symptoms now, which is reasonable, as they have been stable.  We will follow-up with her after labs and imaging are completed.    1. Nausea  - Adult GI  Referral - Consult Only  - NM Gastric Emptying; Future  - XR Abdomen 2 Views; Future  - CRP, inflammation; Future  - Calprotectin Feces; Future  - Tissue transglutaminase joy IgA and IgG; Future  - IgA; Future  - Hepatic panel (Albumin, ALT, AST, Bili, Alk Phos, TP); Future  - Lipase; Future  - Calprotectin Feces  - CRP, inflammation  - Tissue transglutaminase joy IgA and IgG  - IgA  - Hepatic panel (Albumin, ALT, AST, Bili, Alk Phos, TP)  - Lipase    2.  Abdominal pain, generalized (Primary)  - NM Gastric Emptying; Future  - XR Abdomen 2 Views; Future  - CRP, inflammation; Future  - Calprotectin Feces; Future  - Tissue transglutaminase joy IgA and IgG; Future  - IgA; Future  - Hepatic panel (Albumin, ALT, AST, Bili, Alk Phos, TP); Future  - Lipase; Future  - Calprotectin Feces  - CRP, inflammation  - Tissue transglutaminase joy IgA and IgG  - IgA  - Hepatic panel (Albumin, ALT, AST, Bili, Alk Phos, TP)  - Lipase    3. Altered bowel habits  - NM Gastric Emptying; Future  - XR Abdomen 2 Views; Future  - CRP, inflammation; Future  - Calprotectin Feces; Future  - Tissue transglutaminase joy IgA and IgG; Future  - IgA; Future  - Hepatic panel (Albumin, ALT, AST, Bili, Alk Phos, TP); Future  - Lipase; Future  - Calprotectin Feces  - CRP, inflammation  - Tissue transglutaminase joy IgA and IgG  - IgA  - Hepatic panel (Albumin, ALT, AST, Bili, Alk Phos, TP)  - Lipase      Thank you for this consultation.  It was a pleasure to participate in the care of this patient; please contact us with any further questions.      35 minutes spent on the date of the encounter doing chart review, patient visit, and documentation    This note was created with voice recognition software, and while reviewed for accuracy, typos may remain.     Chinmay lAmanzar PA-C  Division of Gastroenterology, Hepatology and Nutrition  [facility]  Thea Draper is a 34 year old female with a past medical history significant for postural orthostatic tachycardia syndrome, hypermobile Haja-Danlos syndrome, ADHD, autism spectrum disorder, recently moved to the [facility] from [place] that is seen as a new patient in the GI clinic today for longstanding history of digestive distress symptoms.  She recalls initially having digestive issues starting in childhood and these have progressed into adulthood.  She describes chronic nausea, which is  essentially persistent, along with spells of generalized abdominal pain and cramping, as well as fluctuating bowel habits between diarrhea and constipation.  She can wake up with nausea and it can fluctuate throughout the day, not necessarily worse or better after eating.  Spells of abdominal pain are generally not prompted by eating or drinking, seem to occur randomly, described as sharp in nature.  She tends to have more frequent loose stools, but can also pass hard, dry stool at times.  Usually has BM daily, but occasionally will skip days.  No blood in the stool but has seen mucus.  She previously had issues with vomiting, but as of now, it is really only the nausea.  She does have some early satiety with meals.  There is no heartburn, reflux, dysphagia, or regurgitation.  She has trialed antiemetics and antacids in the past without any relief.  She eats an average diet, does not avoid any foods and is not aware of any specific dietary triggers that make her symptoms worse.    Surgical history is pertinent for  section x 3.  She is unaware of any specific family medical history of digestive diseases.  There is no tobacco or alcohol use, but she does use cannabis, has been using since the age of 11.    She has not had EGD or colonoscopy before.  She does have a noncontrast enhanced CT abdomen and pelvis from a couple of years ago which does not show any abnormal findings.  Current medications include transdermal combined contraceptive patch, Vyvanse.  No OTC meds or NSAIDs.    ROS:    10 point ROS neg other than the symptoms noted above in the HPI.    PREVIOUS ENDOSCOPY:  None    PERTINENT RELEVANT IMAGING OR LABS:  Reviewed    Office Visit on 2024   Component Date Value Ref Range Status     Cholesterol 2024 172  <200 mg/dL Final     Triglycerides 2024 76  <150 mg/dL Final     Direct Measure HDL 2024 92  >=50 mg/dL Final     LDL Cholesterol Calculated 2024 65  <100 mg/dL  Final     Non HDL Cholesterol 11/12/2024 80  <130 mg/dL Final     Patient Fasting > 8hrs? 11/12/2024 Yes   Final     Sodium 11/12/2024 139  135 - 145 mmol/L Final     Potassium 11/12/2024 4.1  3.4 - 5.3 mmol/L Final     Chloride 11/12/2024 104  98 - 107 mmol/L Final     Carbon Dioxide (CO2) 11/12/2024 24  22 - 29 mmol/L Final     Anion Gap 11/12/2024 11  7 - 15 mmol/L Final     Urea Nitrogen 11/12/2024 10.0  6.0 - 20.0 mg/dL Final     Creatinine 11/12/2024 0.77  0.51 - 0.95 mg/dL Final     GFR Estimate 11/12/2024 >90  >60 mL/min/1.73m2 Final    eGFR calculated using 2021 CKD-EPI equation.     Calcium 11/12/2024 9.0  8.8 - 10.4 mg/dL Final    Reference intervals for this test were updated on 7/16/2024 to reflect our healthy population more accurately. There may be differences in the flagging of prior results with similar values performed with this method. Those prior results can be interpreted in the context of the updated reference intervals.     Glucose 11/12/2024 87  70 - 99 mg/dL Final     Patient Fasting > 8hrs? 11/12/2024 Yes   Final     TSH 11/12/2024 1.40  0.30 - 4.20 uIU/mL Final     WBC Count 11/12/2024 8.8  4.0 - 11.0 10e3/uL Final     RBC Count 11/12/2024 4.46  3.80 - 5.20 10e6/uL Final     Hemoglobin 11/12/2024 13.0  11.7 - 15.7 g/dL Final     Hematocrit 11/12/2024 39.5  35.0 - 47.0 % Final     MCV 11/12/2024 89  78 - 100 fL Final     MCH 11/12/2024 29.1  26.5 - 33.0 pg Final     MCHC 11/12/2024 32.9  31.5 - 36.5 g/dL Final     RDW 11/12/2024 12.3  10.0 - 15.0 % Final     Platelet Count 11/12/2024 204  150 - 450 10e3/uL Final     % Neutrophils 11/12/2024 69  % Final     % Lymphocytes 11/12/2024 23  % Final     % Monocytes 11/12/2024 5  % Final     % Eosinophils 11/12/2024 2  % Final     % Basophils 11/12/2024 1  % Final     % Immature Granulocytes 11/12/2024 0  % Final     Absolute Neutrophils 11/12/2024 6.1  1.6 - 8.3 10e3/uL Final     Absolute Lymphocytes 11/12/2024 2.0  0.8 - 5.3 10e3/uL Final      Absolute Monocytes 2024 0.4  0.0 - 1.3 10e3/uL Final     Absolute Eosinophils 2024 0.2  0.0 - 0.7 10e3/uL Final     Absolute Basophils 2024 0.1  0.0 - 0.2 10e3/uL Final     Absolute Immature Granulocytes 2024 0.0  <=0.4 10e3/uL Final         ALLERGIES:     Allergies   Allergen Reactions     Latex Unknown     Itchy and rash       PERTINENT MEDICATIONS:    Current Outpatient Medications:      lisdexamfetamine (VYVANSE) 30 MG capsule, Take 1 capsule (30 mg) by mouth daily., Disp: 30 capsule, Rfl: 0     norelgestromin-ethinyl estradiol (ZAFEMY) 150-35 MCG/24HR patch, Place 1 patch onto the skin once a week., Disp: , Rfl:     PROBLEM LIST  Patient Active Problem List    Diagnosis Date Noted     Nausea 2024     Priority: Medium     Bilateral hip pain 2024     Priority: Medium     Attention deficit hyperactivity disorder (ADHD), unspecified ADHD type 2024     Priority: Medium     Autism spectrum disorder 2024     Priority: Medium     Hypermobile Haja-Danlos syndrome 2024     Priority: Medium     Mixed obsessional thoughts and acts 2024     Priority: Medium     Family history of melanoma 2024     Priority: Medium     Breast pain 2024     Priority: Medium     POTS (postural orthostatic tachycardia syndrome) 2022     Priority: Medium       PERTINENT PAST MEDICAL HISTORY:  No past medical history on file.    PREVIOUS SURGERIES:  Past Surgical History:   Procedure Laterality Date      SECTION      x3     OTHER SURGICAL HISTORY      r breast benign tumor removed, has a maker here.       SOCIAL HISTORY:  Social History     Socioeconomic History     Marital status: Single     Spouse name: Not on file     Number of children: Not on file     Years of education: Not on file     Highest education level: Not on file   Occupational History     Not on file   Tobacco Use     Smoking status: Never     Smokeless tobacco: Never   Vaping Use     Vaping  status: Never Used   Substance and Sexual Activity     Alcohol use: Not Currently     Drug use: Yes     Types: Marijuana     Sexual activity: Not on file   Other Topics Concern     Not on file   Social History Narrative     Not on file     Social Drivers of Health     Financial Resource Strain: Low Risk  (11/12/2024)    Financial Resource Strain      Within the past 12 months, have you or your family members you live with been unable to get utilities (heat, electricity) when it was really needed?: No   Food Insecurity: Low Risk  (11/12/2024)    Food Insecurity      Within the past 12 months, did you worry that your food would run out before you got money to buy more?: No      Within the past 12 months, did the food you bought just not last and you didn t have money to get more?: No   Transportation Needs: Low Risk  (11/12/2024)    Transportation Needs      Within the past 12 months, has lack of transportation kept you from medical appointments, getting your medicines, non-medical meetings or appointments, work, or from getting things that you need?: No   Physical Activity: Insufficiently Active (11/12/2024)    Exercise Vital Sign      Days of Exercise per Week: 3 days      Minutes of Exercise per Session: 30 min   Stress: Stress Concern Present (11/12/2024)    [employer] - Occupational Stress Questionnaire      Feeling of Stress : To some extent   Social Connections: Unknown (11/12/2024)    Social Connection and Isolation Panel [NHANES]      Frequency of Communication with Friends and Family: Not on file      Frequency of Social Gatherings with Friends and Family: Never      Attends Church Services: Not on file      Active Member of Clubs or Organizations: Not on file      Attends Club or Organization Meetings: Not on file      Marital Status: Not on file   Interpersonal Safety: Low Risk  (11/12/2024)    Interpersonal Safety      Do you feel physically and emotionally safe where you  "currently live?: Yes      Within the past 12 months, have you been hit, slapped, kicked or otherwise physically hurt by someone?: No      Within the past 12 months, have you been humiliated or emotionally abused in other ways by your partner or ex-partner?: No   Housing Stability: Low Risk  (11/12/2024)    Housing Stability      Do you have housing? : Yes      Are you worried about losing your housing?: No       FAMILY HISTORY:  Family History   Problem Relation Age of Onset     Melanoma Mother      Breast Cancer No family hx of      Colon Cancer No family hx of        Past/family/social history reviewed and no changes    PHYSICAL EXAMINATION:  Constitutional: aaox3, cooperative, pleasant, not dyspneic/diaphoretic, no acute distress  Vitals reviewed: /80 (BP Location: Left arm, Patient Position: Sitting, Cuff Size: Adult Regular)   Pulse 79   Ht 1.638 m (5' 4.5\")   Wt 59.4 kg (130 lb 14.4 oz)   SpO2 99%   BMI 22.12 kg/m    Wt:   Wt Readings from Last 2 Encounters:   01/23/25 59.4 kg (130 lb 14.4 oz)   11/12/24 62.7 kg (138 lb 3.2 oz)      Eyes: Sclera anicteric/injected  CV: No edema  Respiratory: Unlabored breathing  Abd: Nondistended, +bs, no hepatosplenomegaly, nontender, no peritoneal signs  Skin: warm, perfused, no jaundice  Psych: Normal affect  MSK: Normal gait                      Again, thank you for allowing me to participate in the care of your patient.        Sincerely,        Chinmay Almanzar PA-C    Electronically signed"

## 2025-01-23 NOTE — PATIENT INSTRUCTIONS
It was a pleasure meeting with you today and discussing your healthcare plan. Below is a summary of what we covered:    - Complete labs today  - Schedule abdominal x-ray and gastric emptying scan      Please see below for any additional questions and scheduling guidelines.    Sign up for Coresonic: Coresonic patient portal serves as a secure platform for accessing your medical records from the [facility]. Additionally, Coresonic facilitates easy, timely, and secure messaging with your care team. If you have not signed up, you may do so by using the provided code or calling [phone].    Coordinating your care after your visit:  There are multiple options for scheduling your follow-up care based on your provider's recommendation.    How do I schedule a follow-up clinic appointment:   After your appointment, you may receive scheduling assistance with the Clinic Coordinators by having a seat in the waiting room and a Clinic Coordinator will call you up to schedule.  Virtual visits or after you leave the clinic:  Your provider has placed a follow-up order in the [employer] portal for scheduling your return appointment. A member of the scheduling team will contact you to schedule.  C[employer]onic Scheduling: Timely scheduling through Coresonic is advised to ensure appointment availability.   Call to schedule: You may schedule your follow-up appointment(s) by calling [phone], option 1.    How do I schedule my endoscopy or colonoscopy procedure:  If a procedure, such as a colonoscopy or upper endoscopy was ordered by your provider, the scheduling team will contact you to schedule this procedure. Or you may choose to call to schedule at   [phone], option 2.  Please allow 20-30 minutes when scheduling a procedure.    How do I get my blood work done? To get your blood work done, you need to schedule a lab appointment at an [facility]. There are multiple ways to schedule:   At the clinic: The Clinic  Coordinator you meet after your visit can help you schedule a lab appointment.   Qloud scheduling: Qloud offers online lab scheduling at all [facility] laboratory locations.   Call to schedule: You can call [phone] to schedule your lab appointment.    How do I schedule my imaging study: To schedule imaging studies, such as CT scans, ultrasounds, MRIs, or X-rays, contact Imaging Services at [phone].    How do I schedule a referral to another doctor: If your provider recommended a referral to another specialist(s), the referral order was placed by your provider. You will receive a phone call to schedule this referral, or you may choose to call the number attached to the referral to self-schedule.    For Post-Visit Question(s):  For any inquiries following today's visit:  Please utilize Qloud messaging and allow 48 hours for reply or contact the Call Center during normal business hours at [phone], option 3.  For Emergent After-hours questions, contact the On-Call GI Fellow through the [facility]  at [phone].  In addition, you may contact your Nurse directly using the provided contact information.    Test Results:  Test results will be accessible via Qloud in compliance with the 21st Century Cures Act. This means that your results will be available to you at the same time as your provider. Often you may see your results before your provider does. Results are reviewed by staff within two weeks with communication follow-up. Results may be released in the patient portal prior to your care team review.    Prescription Refill(s):  Medication prescribed by your provider will be addressed during your visit. For future refills, please coordinate with your pharmacy. If you have not had a recent clinic visit or routine labs, for your safety, your provider may not be able to refill your prescription.

## 2025-01-23 NOTE — NURSING NOTE
"Chief Complaint   Patient presents with    New Patient     New consult for nausea, abdominal cramping, alternating constipation and diarrhea.     She requests these members of her care team be copied on today's visit information:  PCP:   Kathryn Evans PA-C  [address] [place] [address]    Vitals:    01/23/25 1445   BP: 137/80   BP Location: Left arm   Patient Position: Sitting   Cuff Size: Adult Regular   Pulse: 79   SpO2: 99%   Weight: 59.4 kg (130 lb 14.4 oz)   Height: 1.638 m (5' 4.5\")     Body mass index is 22.12 kg/m .    Do you have a history of colon cancer in your immediate family? NO      Medications were reconciled.        Juliet Rubin CMA        "

## 2025-01-27 LAB
TTG IGA SER-ACNC: <0.2 U/ML
TTG IGG SER-ACNC: <0.6 U/ML

## 2025-01-28 ENCOUNTER — OFFICE VISIT (OUTPATIENT)
Dept: FAMILY MEDICINE | Facility: CLINIC | Age: 35
End: 2025-01-28
Payer: COMMERCIAL

## 2025-01-28 VITALS
WEIGHT: 129.2 LBS | HEART RATE: 85 BPM | OXYGEN SATURATION: 99 % | TEMPERATURE: 98.9 F | BODY MASS INDEX: 21.52 KG/M2 | SYSTOLIC BLOOD PRESSURE: 112 MMHG | HEIGHT: 65 IN | RESPIRATION RATE: 20 BRPM | DIASTOLIC BLOOD PRESSURE: 70 MMHG

## 2025-01-28 DIAGNOSIS — Z79.899 CONTROLLED SUBSTANCE AGREEMENT SIGNED: ICD-10-CM

## 2025-01-28 DIAGNOSIS — M25.50 MULTIPLE JOINT PAIN: Primary | ICD-10-CM

## 2025-01-28 DIAGNOSIS — G90.A POTS (POSTURAL ORTHOSTATIC TACHYCARDIA SYNDROME): ICD-10-CM

## 2025-01-28 DIAGNOSIS — F90.9 ATTENTION DEFICIT HYPERACTIVITY DISORDER (ADHD), UNSPECIFIED ADHD TYPE: ICD-10-CM

## 2025-01-28 PROCEDURE — G2211 COMPLEX E/M VISIT ADD ON: HCPCS | Performed by: PHYSICIAN ASSISTANT

## 2025-01-28 PROCEDURE — 99214 OFFICE O/P EST MOD 30 MIN: CPT | Performed by: PHYSICIAN ASSISTANT

## 2025-01-28 ASSESSMENT — PAIN SCALES - GENERAL: PAINLEVEL_OUTOF10: MODERATE PAIN (6)

## 2025-01-28 NOTE — LETTER
[facility]  01/28/25  Patient: Thea Draper  YOB: 1990  Medical Record Number: [id number]                                                                                  Non-Opioid Controlled Substance Agreement    This is an agreement between you and your provider regarding safe and appropriate use of controlled substances prescribed by your care team. Controlled substances are?medicines that can cause physical and mental dependence (abuse).     There are strict laws about having and using these medicines. We here at [facility] are  committed to working with you in your efforts to get better. To support you in this work, we'll help you schedule regular office appointments for medicine refills. If we must cancel or change your appointment for any reason, we'll make sure you have enough medicine to last until your next appointment.     As a Provider, I will:   Listen carefully to your concerns while treating you with respect.   Recommend a treatment plan that I believe is in your best interest and may involve therapies other than medicine.    Talk with you often about the possible benefits and the risk of harm of any medicine that we prescribe for you.  Assess the safety of this medicine and check how well it works.    Provide a plan on how to taper (discontinue or go off) using this medicine if the decision is made to stop its use.      ::  As a Patient, I understand controlled substances:     Are prescribed by my care provider to help me function or work and manage my condition(s).?  Are strong medicines and can cause serious side effects.     Need to be taken exactly as prescribed.?Combining controlled substances with certain medicines or chemicals (such as illegal drugs, alcohol, sedatives, sleeping pills, and benzodiazepines) can be dangerous or even fatal.? If I stop taking my medicines suddenly, I may have severe withdrawal symptoms.     The risks, benefits,  and side effects of these medicine(s) were explained to me. I agree that:    I will take part in other treatments as advised by my care team. This may be psychiatry or counseling, physical therapy, behavioral therapy, group treatment or a referral to specialist.    I will keep all my appointments and understand this is part of the monitoring of controlled substances.?My care team may require an office visit for EVERY controlled substance refill. If I miss appointments or don t follow instructions, my care team may stop my medicine    I will take my medicines as prescribed. I will not change the dose or schedule unless my care team tells me to. There will be no refills if I run out early.      I may be asked to come to the clinic and complete a urine drug test or complete a pill count. If I don t give a urine sample or participate in a pill count, the care team may stop my medicine.    I will only receive controlled substance prescriptions from this clinic. If I am treated by another provider, I will tell them that I am taking controlled substances and that I have a treatment agreement with this provider. I will inform my [facility] care team within one business day if I am given a prescription for any controlled substance by another healthcare provider. My [facility] care team can contact other providers and pharmacists about my use of any medicines.    It is up to me to make sure that I don't run out of my medicines on weekends or holidays.?If my care team is willing to refill my prescription without a visit, I must request refills only during office hours. Refills may take up to 3 business days to process. I will use one pharmacy to fill all my controlled substance prescriptions. I will notify the clinic about any changes to my insurance or medicine availability.    I am responsible for my prescriptions. If the medicine/prescription is lost, stolen or destroyed, it will not be replaced.?I also agree  not to share controlled substance medicines with anyone.     I am aware I should not use any illegal or recreational drugs. I agree not to drink alcohol unless my care team says I can.     If I enroll in the [place] Medical Cannabis program, I will tell my care team before my next refill.    I will tell my care team right away if I become pregnant, have a new medical problem treated outside of my regular clinic, or have a change in my medicines.     I understand that this medicine can affect my thinking, judgment and reaction time.? Alcohol and drugs affect the brain and body, which can affect the safety of my driving. Being under the influence of alcohol or drugs can affect my decision-making, behaviors, personal safety and the safety of others. Driving while impaired (DWI) can occur if a person is driving, operating or in physical control of a car, motorcycle, boat, snowmobile, ATV, motorbike, off-road vehicle or any other motor vehicle ([place] Statute 169A.20). I understand the risk if I choose to drive or operate any vehicle or machinery.    I understand that if I do not follow any of the conditions above, my prescriptions or treatment may be stopped or changed.   I agree that my provider, clinic care team and pharmacy may work with any city, state or federal law enforcement agency that investigates the misuse, sale or other diversion of my controlled medicine. I will allow my provider to discuss my care with, or share a copy of, this agreement with any other treating provider, pharmacy or emergency room where I receive care.     I have read this agreement and have asked questions about anything I did not understand.    ________________________________________________________  Patient Signature - Thea Draper     ___________________                   Date     ________________________________________________________  Provider Signature - Kathryn Evans PA-C       ___________________                    Date     ________________________________________________________  Witness Signature (required if provider not present while patient signing)          ___________________                   Date

## 2025-01-28 NOTE — PATIENT INSTRUCTIONS
Let me know if insurance covers another rheumatology provider and I can send over referral if needed. Some patients go to [facility] rheumatologist in [place]

## 2025-01-28 NOTE — PROGRESS NOTES
Assessment & Plan     Multiple joint pain  Complex patient, see hpi  Needs complete rheum eval, no lab evidence of lupus at this time  Fh of RA and personal history of raynauds  Will add more labs  - Adult Rheumatology  Referral; Future  - Anti Nuclear Claire IgG by IFA with Reflex; Future  - Rheumatoid factor; Future  - Cyclic Citrullinated Peptide Antibody IgG; Future    POTS (postural orthostatic tachycardia syndrome)  Symptoms not controlled  - Adult Cardiology Eval  Referral; Future    Controlled substance agreement signed      Attention deficit hyperactivity disorder (ADHD), unspecified ADHD type  Filled out controlled substance together, agrees to terms  Recheck 6 months sooner if needed  May want to increase to 40 mg from 30 at next dosing as noticing not working as well  To psych in future if needed    38 min spent on patient today including chart review, history, answering questions,  exam, and explaining treatment plan and follow-up.       The longitudinal plan of care for the diagnosis(es)/condition(s) as documented were addressed during this visit. Due to the added complexity in care, I will continue to support Fernanda in the subsequent management and with ongoing continuity of care.      Patient Instructions   Let me know if insurance covers another rheumatology provider and I can send over referral if needed. Some patients go to O rheumatologist in [facility]   Fernanda is a 34 year old, presenting for the following health issues:  Recheck Medication and Symptoms (Broad symptoms)        1/28/2025    10:27 AM   Additional Questions   Roomed by Jaqueline GARCIA MA   Accompanied by n/a         1/28/2025    10:27 AM   Patient Reported Additional Medications   Patient reports taking the following new medications No Medications Missing     History of Present Illness       Reason for visit:  Follow up for several symptoms    She eats 0-1 servings of fruits and vegetables daily.She  consumes 0 sweetened beverage(s) daily.She exercises with enough effort to increase her heart rate 20 to 29 minutes per day.  She exercises with enough effort to increase her heart rate 3 or less days per week.   She is taking medications regularly.     Has symptoms  that have been happening on and off her entire life.   - is seeing GI for stomach related issues. Did blood work there and CRP was elevated. Is concerned about this.   We discussed the GI provider is still working them up and karla f/u on crp after imaging studies done which they have not been. Also discussed it is nonspecific test.     Pt is concerned she may have lupus regardless of negative antibody testing done in November 2024. Would like to know if her symptoms could be caused by something similar. Patient has history of ocd.     Great aunt had RA. Is askjuarez albright per patient also so concerned.     Lupus concern: butterfly rash on face off and on, has h/o raynaud's, has mouth sores that come and go, history of heart murmur per patient.       Chronic conditions:     SH: 4 children disabled autism, adhd, cognitive issues-stays at home and takes care of all of them. Moved to [facility] for better schools.      Abdominal pain-seeing gi.     Zafemy patches-helped with menorrhgia.      Adhd and autism spectrum disorder diagnosed through [facility]-seen through [facility] most recently, records in care everywhere.   Vyvanse 30 mg last filled in april 30 mg 30 capsules dispensed. She has not been taking as she moved from [place] to here and hadn't established yet.   [place] concerns [place].   Without medication she is more scatterbrained. Can be irritible while taking it. For her ocd she has not tried medication due to having side effects with several meds.   previously used medical thc but now just otc when it became legal.  Interested in psychiatry or therapy referral? In future if needed  Denies suicidal or homicidal thoughts.  Patient  "instructed to go to the emergency room or call 911 if these occur.       H/o pots and was getting infusions for this through specialist that were helpful.      Hypermobility-saw a specialist previously. Not sure whom in [place].   Seen rheum? Never  Pt is concerned she may have lupus regardless of negative antibody testing done in November 2024. Would like to know if her symptoms could be caused by something similar. Patient has history of ocd.     Great aunt had RA. Is askanasi Anab[employer] per patient also so concerned.     Lupus concern: butterfly rash on face off and on, has h/o raynaud's, has mouth sores that come and go, history of heart murmur per patient.      ERICKSON in hips and labral tear-saw seeing ortho for this in [place]. Would like to see one here. Previously had hip injection. Referral? Wants to wait at this time.           Objective    /70   Pulse 85   Temp 98.9  F (37.2  C) (Temporal)   Resp 20   Ht 1.644 m (5' 4.72\")   Wt 58.6 kg (129 lb 3.2 oz)   LMP 01/16/2025 (Within Weeks)   SpO2 99%   BMI 21.68 kg/m    Body mass index is 21.68 kg/m .  Physical Exam   GENERAL: alert and no distress  RESP: nonlaobred   CV: RRR  MS: no gross musculoskeletal defects noted, no edema  PSYCH: mentation appears normal and anxious            Signed Electronically by: Kathryn Evans PA-C    "

## 2025-01-29 ENCOUNTER — ANCILLARY PROCEDURE (OUTPATIENT)
Dept: GENERAL RADIOLOGY | Facility: CLINIC | Age: 35
End: 2025-01-29
Attending: PHYSICIAN ASSISTANT
Payer: COMMERCIAL

## 2025-01-29 DIAGNOSIS — R10.84 ABDOMINAL PAIN, GENERALIZED: ICD-10-CM

## 2025-01-29 DIAGNOSIS — R19.4 ALTERED BOWEL HABITS: ICD-10-CM

## 2025-01-29 DIAGNOSIS — R11.0 NAUSEA: ICD-10-CM

## 2025-01-29 PROCEDURE — 74019 RADEX ABDOMEN 2 VIEWS: CPT | Mod: TC | Performed by: STUDENT IN AN ORGANIZED HEALTH CARE EDUCATION/TRAINING PROGRAM

## 2025-01-30 NOTE — RESULT ENCOUNTER NOTE
Daniel Michael,    The report from your recent abdominal x-ray is available for you to review.    The purpose of this exam was to assess the stool and gas pattern in the colon.  In your case, there does appear to be a moderate amount of stool, more so on the right side of the colon, which is generally consistent with constipation.  Fortunately, there does not appear to be any obstruction.    I would recommend some sustained treatment for constipation.  There are a couple of options here.  You could start with 1 capful of MiraLAX daily.  If this is too potent or not tolerated, you could do 1 tablespoon of a powdered fiber like Citrucel daily.    Please let me know if you have any questions.    Sincerely,  Chinmay CALZADA [facility] GI, Hepatology, and Nutrition

## 2025-02-05 ENCOUNTER — MYC MEDICAL ADVICE (OUTPATIENT)
Dept: FAMILY MEDICINE | Facility: CLINIC | Age: 35
End: 2025-02-05

## 2025-02-05 DIAGNOSIS — F90.9 ATTENTION DEFICIT HYPERACTIVITY DISORDER (ADHD), UNSPECIFIED ADHD TYPE: Primary | ICD-10-CM

## 2025-02-05 NOTE — TELEPHONE ENCOUNTER
Per 1/28/25 visit note    Attention deficit hyperactivity disorder (ADHD), unspecified ADHD type  Filled out controlled substance together, agrees to terms  Recheck 6 months sooner if needed  May want to increase to 40 mg from 30 at next dosing as noticing not working as well  To psych in future if needed     [place] RN on 2/5/2025 at 10:23 AM

## 2025-02-06 ENCOUNTER — MYC MEDICAL ADVICE (OUTPATIENT)
Dept: GASTROENTEROLOGY | Facility: CLINIC | Age: 35
End: 2025-02-06
Payer: COMMERCIAL

## 2025-02-06 DIAGNOSIS — R19.4 ALTERED BOWEL HABITS: Primary | ICD-10-CM

## 2025-02-06 DIAGNOSIS — R11.0 NAUSEA: ICD-10-CM

## 2025-02-06 DIAGNOSIS — R10.84 ABDOMINAL PAIN, GENERALIZED: ICD-10-CM

## 2025-02-06 RX ORDER — LISDEXAMFETAMINE DIMESYLATE 40 MG/1
40 CAPSULE ORAL DAILY
Qty: 30 CAPSULE | Refills: 0 | Status: SHIPPED | OUTPATIENT
Start: 2025-03-08 | End: 2025-04-07

## 2025-02-06 RX ORDER — LISDEXAMFETAMINE DIMESYLATE 40 MG/1
40 CAPSULE ORAL DAILY
Qty: 30 CAPSULE | Refills: 0 | Status: SHIPPED | OUTPATIENT
Start: 2025-02-06 | End: 2025-03-08

## 2025-02-06 RX ORDER — LISDEXAMFETAMINE DIMESYLATE 40 MG/1
40 CAPSULE ORAL DAILY
Qty: 30 CAPSULE | Refills: 0 | Status: SHIPPED | OUTPATIENT
Start: 2025-04-07 | End: 2025-05-07

## 2025-02-11 ENCOUNTER — LAB (OUTPATIENT)
Dept: LAB | Facility: CLINIC | Age: 35
End: 2025-02-11
Payer: COMMERCIAL

## 2025-02-11 DIAGNOSIS — M25.50 MULTIPLE JOINT PAIN: ICD-10-CM

## 2025-02-11 LAB — RHEUMATOID FACT SERPL-ACNC: <10 IU/ML

## 2025-02-11 PROCEDURE — 86431 RHEUMATOID FACTOR QUANT: CPT

## 2025-02-11 PROCEDURE — 86038 ANTINUCLEAR ANTIBODIES: CPT

## 2025-02-11 PROCEDURE — 86039 ANTINUCLEAR ANTIBODIES (ANA): CPT

## 2025-02-11 PROCEDURE — 36415 COLL VENOUS BLD VENIPUNCTURE: CPT

## 2025-02-11 PROCEDURE — 86200 CCP ANTIBODY: CPT

## 2025-02-12 LAB
ANA PAT SER IF-IMP: ABNORMAL
ANA SER QL IF: ABNORMAL
ANA TITR SER IF: ABNORMAL {TITER}
CCP AB SER IA-ACNC: 1.7 U/ML

## 2025-02-13 NOTE — RESULT ENCOUNTER NOTE
Daniel Sidhu,       Your recent test results are attached, if you have any questions or concerns please feel free to contact me via e-mail or call [phone].  Rheumatoid factor and anti-CCP testing negative this makes rheumatoid arthritis diagnosis less likely for you.  ISAURA is borderline positive usually this is a false negative test that can be repeated again in the future.  I would let rheumatology take over any further testing for this.     It was a pleasure to see you at your recent office visit.      Sincerely,  Kathryn Evans PA-C

## 2025-02-14 ENCOUNTER — TELEPHONE (OUTPATIENT)
Dept: GASTROENTEROLOGY | Facility: CLINIC | Age: 35
End: 2025-02-14

## 2025-02-14 ENCOUNTER — HOSPITAL ENCOUNTER (OUTPATIENT)
Facility: AMBULATORY SURGERY CENTER | Age: 35
End: 2025-02-14
Attending: INTERNAL MEDICINE
Payer: COMMERCIAL

## 2025-02-14 DIAGNOSIS — R10.84 ABDOMINAL PAIN, GENERALIZED: Primary | ICD-10-CM

## 2025-02-14 DIAGNOSIS — R19.4 ALTERED BOWEL HABITS: ICD-10-CM

## 2025-02-14 RX ORDER — BISACODYL 5 MG/1
TABLET, DELAYED RELEASE ORAL
Qty: 4 TABLET | Refills: 0 | Status: SHIPPED | OUTPATIENT
Start: 2025-02-14

## 2025-02-14 NOTE — TELEPHONE ENCOUNTER
Attempted to contact patient in order to complete pre assessment questions.     No answer. Left message to return call to [phone] option 3    Callback required communication sent via gumi.    --------------------------------------------------------------------------------------------------------------------    Procedure details:    Patient scheduled for Colonoscopy/Upper endoscopy (EGD) on 2/26/25.     Approximate arrival time: 1315. Procedure time 1400.   *Ensure patient is aware that endoscopy team will be calling about 2 days prior to procedure date to confirm arrival time as this may change.     Facility location: [facility] [place]; [address] [place] [address] [address] Floor, [place] [address]. Check in location: 2nd Floor at Surgery desk.  *Disclaimer: Drivers are to check in with patient and stay on campus during procedure.     Sedation type: Conscious sedation     Pre op exam needed? No.    Indication for procedure:   Altered bowel habits   Abdominal pain, generalized   Nausea         Chart review:     Electronic implanted devices? No    Recent diagnosis of diverticulitis within the last 6 weeks? No      Medication review:    Diabetic? No    Anticoagulants? No    Weight loss medication/injectable? No GLP-1 medication per patient's medication list. Nursing to verify with pre-assessment call.    Other medication HOLDING recommendations:  N/A      Prep for procedure:     Bowel prep recommendation: Extended Golytely. Bowel prep sent to [id number] - [place], [place] - [address]  [facility].  Due to: constipation noted or reported    Procedure information and instructions sent via gumi         Ashly Carr RN  Endoscopy Procedure Pre Assessment   [phone] option 3

## 2025-02-17 ENCOUNTER — OFFICE VISIT (OUTPATIENT)
Dept: DERMATOLOGY | Facility: CLINIC | Age: 35
End: 2025-02-17
Payer: COMMERCIAL

## 2025-02-17 DIAGNOSIS — L30.9 FACIAL DERMATITIS: ICD-10-CM

## 2025-02-17 DIAGNOSIS — D22.9 MULTIPLE BENIGN NEVI: ICD-10-CM

## 2025-02-17 DIAGNOSIS — L71.0 PERIORAL DERMATITIS: ICD-10-CM

## 2025-02-17 DIAGNOSIS — Z80.8 FAMILY HISTORY OF MELANOMA: Primary | ICD-10-CM

## 2025-02-17 RX ORDER — AZELAIC ACID 0.15 G/G
GEL TOPICAL
Qty: 50 G | Refills: 3 | Status: SHIPPED | OUTPATIENT
Start: 2025-02-17

## 2025-02-17 ASSESSMENT — PAIN SCALES - GENERAL: PAINLEVEL_OUTOF10: NO PAIN (0)

## 2025-02-17 NOTE — NURSING NOTE
Chief Complaint   Patient presents with    Derm Problem     FBSE-pink, raised spot on L thigh. Painful rash on face that comes and goes     Amanda KAN RN  Dermatology Surgery

## 2025-02-17 NOTE — LETTER
2/17/2025       RE: Thea Draper  [address] [address] [address]     Dear Colleague,    Thank you for referring your patient, Thea Draper, to the [facility] DERMATOLOGY [facility]IC [place] [facility]. Please see a copy of my visit note below.    [facility] Dermatology Note  Encounter Date: Feb 17, 2025  Office Visit     Dermatology Problem List:  1. Family hx Melanoma  2. Atypical nevi, history of tanning bed use  3. Facial dermatitis    Social history: 4 children. Moved to [place] for 9 years, now in cities. Youngest child (7) with autism and attends school in the [facility].   Pt reports POTS and history of Haja-Danlos    ____________________________________________    Assessment & Plan:     # Family history of melanoma   - Mother with multiple melanomas (previously worked in derm clinic)  - Grandmother with melanoma  - She had a prior TBSE in the 90s and had numerous biopsies, was traumatic experience.    # Facial dermatitis, perioral dermatitis vs telangiectatic rosacea    - Start azelaic acid, 15% gel QAM to BID.  - Discussed starting pimecrolimus, however, given the warming sensation of the medication, pt would prefer to avoid as this is a symptom that accompanies her facial redness and irritation  - Can send in Skin medicinals if copay is too high.    #Monitor spot check on Right upper buttock  - Monitor, likely collision of nevi; recheck at next appointment.  - Photo in chart.    #Benign lesions: Seborrheic keratoses, lentigines, cherry angioma, and multiple benign nevi, sebaceous hyperplasia, ink spot lentigo(s)  - Reassurance provided; no treatment is medically necessary      Procedures Performed:   None  None    Follow-up: 6 month(s) in-person, or earlier for new or changing lesions    Staff:    Edna Simon MD PhD  Dermatology, Dermatopathology    ____________________________________________    CC: Derm Problem  (FBSE-pink, raised spot on L thigh. Painful rash on face that comes and goes)      HPI:  Thea Draper is a(n) 34 year old female who presents today as a new patient for skin check. Family history of melanoma.    She has numerous freckles.  Tanning bed exposure, tans in spring before summer.  Had mole occur on the left thigh after tanning.    Patient is otherwise feeling well, without additional skin concerns.     Labs Reviewed:  NA    Physical Exam:  Vitals: LMP 01/16/2025 (Within Weeks)   SKIN: Total skin excluding the undergarment areas was performed. The exam included the head/face, neck, both arms, chest, back, abdomen, both legs, digits and/or nails.   - Multiple scattered nevi  - Upper right buttock with three part nevi, monitor  - Numerous lentigo/ephelides  - Left cheek with ink spot, left lower back with ink spot.  - Numerous tattoos on arms, somewhat occlusive to full skin exam    - No other lesions of concern on areas examined.       Medications:  Current Outpatient Medications   Medication Sig Dispense Refill     azelaic acid (FINACIA) 15 % external gel Apply 1-2 times daily for rosacea and facial irritation. 50 g 3     bisacodyl (DULCOLAX) 5 MG EC tablet Two days prior to exam take two (2) tablets at 4pm. One day prior to exam take two (2) tablets at 4pm 4 tablet 0     lisdexamfetamine (VYVANSE) 40 MG capsule Take 1 capsule (40 mg) by mouth daily. 30 capsule 0     [START ON 3/8/2025] lisdexamfetamine (VYVANSE) 40 MG capsule Take 1 capsule (40 mg) by mouth daily. 30 capsule 0     [START ON 4/7/2025] lisdexamfetamine (VYVANSE) 40 MG capsule Take 1 capsule (40 mg) by mouth daily. 30 capsule 0     norelgestromin-ethinyl estradiol (ZAFEMY) 150-35 MCG/24HR patch Place 1 patch onto the skin once a week.       polyethylene glycol (GOLYTELY) 236 g suspension Two days before procedure at 5PM fill first container with water. Mix and drink an 8 oz glass every 15 minutes until HALF of the container is gone. Place  the remainder in the refrigerator. One day before procedure at 5PM drink second half of bowel prep. Drink an 8 oz glass every 15 minutes until it is gone. Day of procedure 6 hours before arrival time fill the 2nd container with water. Mix and drink an 8 oz glass every 15 minutes until HALF of the container is gone. Discard the remaining solution. 8000 mL 0     No current facility-administered medications for this visit.      Past Medical History:   Patient Active Problem List   Diagnosis     POTS (postural orthostatic tachycardia syndrome)     Nausea     Bilateral hip pain     Attention deficit hyperactivity disorder (ADHD), unspecified ADHD type     Autism spectrum disorder     Hypermobile Haja-Danlos syndrome     Mixed obsessional thoughts and acts     Family history of melanoma     Breast pain     Controlled substance agreement signed       No past medical history on file.    CC: Referring Provider: Kathryn Evans       Again, thank you for allowing me to participate in the care of your patient.      Sincerely,    Edna Simon MD

## 2025-02-17 NOTE — PROGRESS NOTES
[facility] Dermatology Note  Encounter Date: Feb 17, 2025  Office Visit     Dermatology Problem List:  1. Family hx Melanoma  2. Atypical nevi, history of tanning bed use  3. Facial dermatitis    Social history: 4 children. Moved to [place] for 9 years, now in cities. Youngest child (7) with autism and attends school in the cities.   Pt reports POTS and history of Haja-Danlos    ____________________________________________    Assessment & Plan:     # Family history of melanoma   - Mother with multiple melanomas (previously worked in derm clinic)  - Grandmother with melanoma  - She had a prior TBSE in the 90s and had numerous biopsies, was traumatic experience.    # Facial dermatitis, perioral dermatitis vs telangiectatic rosacea    - Start azelaic acid, 15% gel QAM to BID.  - Discussed starting pimecrolimus, however, given the warming sensation of the medication, pt would prefer to avoid as this is a symptom that accompanies her facial redness and irritation  - Can send in Skin medicinals if copay is too high.    #Monitor spot check on Right upper buttock  - Monitor, likely collision of nevi; recheck at next appointment.  - Photo in chart.    #Benign lesions: Seborrheic keratoses, lentigines, cherry angioma, and multiple benign nevi, sebaceous hyperplasia, ink spot lentigo(s)  - Reassurance provided; no treatment is medically necessary      Procedures Performed:   None  None    Follow-up: 6 month(s) in-person, or earlier for new or changing lesions    Staff:    Edna Simon MD PhD  Dermatology, Dermatopathology    ____________________________________________    CC: Derm Problem (FBSE-pink, raised spot on L thigh. Painful rash on face that comes and goes)      HPI:  Thea Draper is a(n) 34 year old female who presents today as a new patient for skin check. Family history of melanoma.    She has numerous freckles.  Tanning bed exposure, tans in spring before summer.  Had mole occur on the  left thigh after tanning.    Patient is otherwise feeling well, without additional skin concerns.     Labs Reviewed:  CHANTELL    Physical Exam:  Vitals: LMP 01/16/2025 (Within Weeks)   SKIN: Total skin excluding the undergarment areas was performed. The exam included the head/face, neck, both arms, chest, back, abdomen, both legs, digits and/or nails.   - Multiple scattered nevi  - Upper right buttock with three part nevi, monitor  - Numerous lentigo/ephelides  - Left cheek with ink spot, left lower back with ink spot.  - Numerous tattoos on arms, somewhat occlusive to full skin exam    - No other lesions of concern on areas examined.       Medications:  Current Outpatient Medications   Medication Sig Dispense Refill    azelaic acid (FINACIA) 15 % external gel Apply 1-2 times daily for rosacea and facial irritation. 50 g 3    bisacodyl (DULCOLAX) 5 MG EC tablet Two days prior to exam take two (2) tablets at 4pm. One day prior to exam take two (2) tablets at 4pm 4 tablet 0    lisdexamfetamine (VYVANSE) 40 MG capsule Take 1 capsule (40 mg) by mouth daily. 30 capsule 0    [START ON 3/8/2025] lisdexamfetamine (VYVANSE) 40 MG capsule Take 1 capsule (40 mg) by mouth daily. 30 capsule 0    [START ON 4/7/2025] lisdexamfetamine (VYVANSE) 40 MG capsule Take 1 capsule (40 mg) by mouth daily. 30 capsule 0    norelgestromin-ethinyl estradiol (ZAFEMY) 150-35 MCG/24HR patch Place 1 patch onto the skin once a week.      polyethylene glycol (GOLYTELY) 236 g suspension Two days before procedure at 5PM fill first container with water. Mix and drink an 8 oz glass every 15 minutes until HALF of the container is gone. Place the remainder in the refrigerator. One day before procedure at 5PM drink second half of bowel prep. Drink an 8 oz glass every 15 minutes until it is gone. Day of procedure 6 hours before arrival time fill the 2nd container with water. Mix and drink an 8 oz glass every 15 minutes until HALF of the container is gone. Discard  the remaining solution. 8000 mL 0     No current facility-administered medications for this visit.      Past Medical History:   Patient Active Problem List   Diagnosis    POTS (postural orthostatic tachycardia syndrome)    Nausea    Bilateral hip pain    Attention deficit hyperactivity disorder (ADHD), unspecified ADHD type    Autism spectrum disorder    Hypermobile Haja-Danlos syndrome    Mixed obsessional thoughts and acts    Family history of melanoma    Breast pain    Controlled substance agreement signed       No past medical history on file.    CC: Referring Provider: Kathryn Evans

## 2025-02-18 ENCOUNTER — TELEPHONE (OUTPATIENT)
Dept: DERMATOLOGY | Facility: CLINIC | Age: 35
End: 2025-02-18

## 2025-02-18 ENCOUNTER — LAB (OUTPATIENT)
Dept: LAB | Facility: CLINIC | Age: 35
End: 2025-02-18
Attending: INTERNAL MEDICINE
Payer: COMMERCIAL

## 2025-02-18 ENCOUNTER — OFFICE VISIT (OUTPATIENT)
Dept: RHEUMATOLOGY | Facility: CLINIC | Age: 35
End: 2025-02-18
Payer: COMMERCIAL

## 2025-02-18 ENCOUNTER — HOSPITAL ENCOUNTER (OUTPATIENT)
Dept: RADIOLOGY | Facility: CLINIC | Age: 35
Discharge: HOME | End: 2025-02-18
Attending: INTERNAL MEDICINE
Payer: COMMERCIAL

## 2025-02-18 VITALS
WEIGHT: 131.3 LBS | OXYGEN SATURATION: 99 % | HEIGHT: 64 IN | DIASTOLIC BLOOD PRESSURE: 69 MMHG | SYSTOLIC BLOOD PRESSURE: 124 MMHG | HEART RATE: 98 BPM | BODY MASS INDEX: 22.42 KG/M2

## 2025-02-18 DIAGNOSIS — M25.50 MULTIPLE JOINT PAIN: ICD-10-CM

## 2025-02-18 DIAGNOSIS — R76.8 POSITIVE ANA (ANTINUCLEAR ANTIBODY): ICD-10-CM

## 2025-02-18 DIAGNOSIS — Z71.89 ENCOUNTER TO DISCUSS TREATMENT OPTIONS: ICD-10-CM

## 2025-02-18 DIAGNOSIS — R76.8 POSITIVE ANA (ANTINUCLEAR ANTIBODY): Primary | ICD-10-CM

## 2025-02-18 DIAGNOSIS — M79.7 FIBROMYALGIA: ICD-10-CM

## 2025-02-18 LAB
Lab: NORMAL
PERFORMING LABORATORY: NORMAL
SPECIMEN STATUS: NORMAL
TEST NAME: NORMAL

## 2025-02-18 PROCEDURE — 99204 OFFICE O/P NEW MOD 45 MIN: CPT | Performed by: INTERNAL MEDICINE

## 2025-02-18 PROCEDURE — 73130 X-RAY EXAM OF HAND: CPT | Mod: 50

## 2025-02-18 PROCEDURE — 86225 DNA ANTIBODY NATIVE: CPT

## 2025-02-18 PROCEDURE — 86235 NUCLEAR ANTIGEN ANTIBODY: CPT

## 2025-02-18 PROCEDURE — 36415 COLL VENOUS BLD VENIPUNCTURE: CPT

## 2025-02-18 NOTE — TELEPHONE ENCOUNTER
2/18 Patient confirmed scheduled appointment:  Date: 7/2/25  Time: 11:15am  Visit type: Return Dermatology  Provider: Alfred  Location: [facility]  Testing/imaging: na  Additional notes: na

## 2025-02-18 NOTE — PROGRESS NOTES
This document was created using a software with less than 100% fidelity, at times resulting in unintended, even erroneous syntax and grammar.  The reader is advised to keep this under consideration while reviewing, interpreting this note.      Rheumatology Consult Note      Thea Draper     YOB: 1990 Age: 34 year old    Date of visit: 2/18/25    PCP: Kathryn Evans    Chief Complaint     Joint and muscle aches and pains, borderline ISAURA (1: 40)        Assessment and Plan   1.We discussed the ISAURA test, its prevalence in the normal population (10-20%, increased prevalence with aging), the nonspecific nature of the ISAURA test and its association with rheumatologic diseases as well as mild rheumatologic conditions (infection, malignancy, seen in 50% of autoimmune thyroid disorders etc.) An isolated ISAURA is NOT specific for any single disease and if the symptoms warrant, the need for more specific blood tests discussed to confirm or rule out certain rheumatologic disorders and refine the diagnostic process.    We will check additional more SPECIFIC blood tests including Smith antibody, RNP, SSA, SSB, double-stranded DNA antibody.  If these additional tests are negative then the likelihood of a lupus or autoimmune condition would be low.  Her rheumatoid serologies were negative and there is no synovitis on joint exam to suggest RA, however we will get x-rays of her hands for completion to make sure there are no erosive changes on the hand x-rays.    If these additional tests are negative, we could consider a 3-month trial with hydroxychloroquine (given its safety profile) if she is amenable.  After 3 months of hydroxychloroquine if there is no sustained improvement in her joint symptoms then I would consider discontinuing it.    2.  We discussed various causes of joint pain including MECHANICAL causes (DJD, disc degeneration, overuse, injury etc.) in contradistinction to autoimmune rheumatologic  "conditions like RA, lupus, psoriasis etc.  Printed literature provided on these differences.    3.  She also has trigger points suggestive of fibromyalgia.Fibromyalgia is a \"pain processing disorder \"due to abnormal neural signaling in the pain centers in the central nervous system.  It is NOT an autoimmune or arthritic disorder.  The pain stems from neurochemical imbalances in the central nervous system leading to CENTRAL PAIN AMPLIFICATION characterized by allodynia (heightened sensitivity to stimuli that are not normally painful), hyperalgesia (increased response to painful stimuli) and other symptoms including headaches, irritable bowel syndrome, TMJ problems, brain fog etc. neuroimaging studies using functional MRIs showed the aberrant pain processing of stimuli in the central nervous system of patients with fibromyalgia compared to healthy controls.  Management of fibromyalgia involves antidepressant type medications (duloxetine, Lyrica), moderate aerobic activity, stress reduction etc.  There is NO ROLE for autoimmune or immunosuppressive medications in treating fibromyalgia.  Managing fibromyalgia is beyond the scope of her rheumatology practice and is best addressed by PCPs, pain clinics and other modalities.      Follow-up 3 weeks    CC PCP            DERRICK     Thea is a nice 34-year-old female, she grew up in [place], spent a few years in [place] and moved to the [facility] a year ago.  She gives a history of aches and pains in multiple joints going on for a number of years including her knees, hips, shoulders, ankles, wrists and lower back.  She has a history of femoral acetabular impingement (ERICKSON) in both hips diagnosed since childhood, has seen orthopedics, however has not been able to take time off for any orthopedic intervention because she takes care of four disabled kids.  Of all the areas listed the hips are her most symptomatic joints.  She also complains of soreness with intermittent swelling in " her fingers and toes.  She has had Raynaud's for a number of years.  The Raynaud's is stable.  She also gives a history of recurrent mouth sores.  No history of vaginal ulcers.  She is of Ashkenazi Hindu extraction.  The left middle finger locks on occasion but not very often.  She was also told she has some joint hypermobility.    She is referred to rheumatology for evaluation of her borderline ISAURA (1: 40).  She has noticed intermittent facial erythema and showed me a picture on her iPhone of erythema involving her right cheek.  Her rheumatoid serologies (RF, CCP) are negative.    She also has abdominal pain, a cause has not been established, she sees gastroenterology.  Her fecal Calprotectin was elevated at 93.6 (normal less than 49) and further GI workup for ulcerative colitis, Crohn disease is being considered.  Hepatitis B and C serologies were negative.  Her thyroid TSH normal (1.4).  Creatinine normal (0.77).  Her CRP tends to run moderately elevated at 12.4, ESR is normal (1).  ALT normal 14.    Social history: She works as a PCA, lives with her 4 kids, grew up in [place], spent some time in [place], then a few years in [place] and for the past year has been living in the [place].  Quit smoking a few years ago.  No alcohol use.  Uses cannabis and Tylenol for pain as needed.           Active Problem List     Patient Active Problem List   Diagnosis    POTS (postural orthostatic tachycardia syndrome)    Nausea    Bilateral hip pain    Attention deficit hyperactivity disorder (ADHD), unspecified ADHD type    Autism spectrum disorder    Hypermobile Haja-Danlos syndrome    Mixed obsessional thoughts and acts    Family history of melanoma    Breast pain    Controlled substance agreement signed     Past Medical History   No past medical history on file.  Past Surgical History     Past Surgical History:   Procedure Laterality Date     SECTION      x3    OTHER SURGICAL HISTORY      r breast benign  tumor removed, has a maker here.     Allergy     Allergies   Allergen Reactions    Latex Unknown     Itchy and rash     Current Medication List     Current Outpatient Medications   Medication Sig Dispense Refill    lisdexamfetamine (VYVANSE) 40 MG capsule Take 1 capsule (40 mg) by mouth daily. 30 capsule 0    [START ON 3/8/2025] lisdexamfetamine (VYVANSE) 40 MG capsule Take 1 capsule (40 mg) by mouth daily. 30 capsule 0    [START ON 4/7/2025] lisdexamfetamine (VYVANSE) 40 MG capsule Take 1 capsule (40 mg) by mouth daily. 30 capsule 0    norelgestromin-ethinyl estradiol (ZAFEMY) 150-35 MCG/24HR patch Place 1 patch onto the skin once a week.      azelaic acid (FINACIA) 15 % external gel Apply 1-2 times daily for rosacea and facial irritation. 50 g 3    bisacodyl (DULCOLAX) 5 MG EC tablet Two days prior to exam take two (2) tablets at 4pm. One day prior to exam take two (2) tablets at 4pm 4 tablet 0    polyethylene glycol (GOLYTELY) 236 g suspension Two days before procedure at 5PM fill first container with water. Mix and drink an 8 oz glass every 15 minutes until HALF of the container is gone. Place the remainder in the refrigerator. One day before procedure at 5PM drink second half of bowel prep. Drink an 8 oz glass every 15 minutes until it is gone. Day of procedure 6 hours before arrival time fill the 2nd container with water. Mix and drink an 8 oz glass every 15 minutes until HALF of the container is gone. Discard the remaining solution. 8000 mL 0     No current facility-administered medications for this visit.       No current facility-administered medications for this visit.        Family History     Family History   Problem Relation Age of Onset    Melanoma Mother     Breast Cancer No family hx of     Colon Cancer No family hx of          Physical Exam     COMPREHENSIVE EXAMINATION:  Vitals:    02/18/25 0950   BP: 124/69   BP Location: Right arm   Patient Position: Sitting   Cuff Size: Adult Large   Pulse: 98  "  SpO2: 99%   Weight: 59.6 kg (131 lb 4.8 oz)   Height: 1.626 m (5' 4\")     Pleasant, alert and oriented x 3.  Not in any acute pain today.    Head/neck: No butterfly rash noted today, no jaundice, no conjunctival pallor, no ocular redness, no facial asymmetry, no enlargement of the major salivary glands, no oral ulcers    Lungs: clear to auscultation, no crackles or wheezing    Heart sounds :regular    Abdomen: Soft, nontender    Musculoskeletal:    Right hand: No synovitis or tenderness of 1-5 MCP joints, no synovitis or tenderness of 1-5 PIP joints.  No swan-neck or boutonniere deformities    Left hand: No synovitis or tenderness of 1-5 MCP joints, no synovitis or tenderness of 1-5 PIP joints, no swan-neck or boutonniere deformities,+ tenderness noted at the A1 pulley of the left middle finger    Right wrist: no synovitis,no tenderness    Left wrist: No tenderness, no synovitis    Skin.  Tattoos noted on the upper extremities    Elbows: Full range of motion, no swelling or synovitis    Right shoulder: Normal abduction, internal and external rotation    Left shoulder: Normal abduction, internal and external rotation    Cervical spine: Normal flexion, lateral rotation bilaterally full    Spine: No alignment abnormalities noted    Right hip:Full  Flexion internal and external rotation    Left hip: Full flexion, internal and external rotation    Right knee: no effusion, no redness, full ROM    Left knee: No effusion, no redness, full ROM    Rt ankle: No swelling, redness or tenderness    Left ankle: No swelling, redness or tenderness    Right foot: No swelling, redness or tenderness of the toes/MTPs    Left foot: No swelling, redness or tenderness of the toes/MTP joints    Trigger points: 16/18 trigger points noted in the posterior cervical, trapezii, paralumbar, elbows, hips, knees          Labs / Imaging (select studies)     Rheumatoid Factor   Date Value Ref Range Status   02/11/2025 <10 <14 IU/mL Final    "   Hemoglobin   Date Value Ref Range Status   11/12/2024 13.0 11.7 - 15.7 g/dL Final     Urea Nitrogen   Date Value Ref Range Status   11/12/2024 10.0 6.0 - 20.0 mg/dL Final     AST   Date Value Ref Range Status   01/23/2025 16 0 - 45 U/L Final     Albumin   Date Value Ref Range Status   01/23/2025 4.3 3.5 - 5.2 g/dL Final     Alkaline Phosphatase   Date Value Ref Range Status   01/23/2025 101 40 - 150 U/L Final     ALT   Date Value Ref Range Status   01/23/2025 14 0 - 50 U/L Final     Rheumatoid Factor   Date Value Ref Range Status   02/11/2025 <10 <14 IU/mL Final          Immunization History     Immunization History   Administered Date(s) Administered    COVID-19 MONOVALENT 12+ (Pfizer) 07/15/2021, 08/05/2021    TDAP (Adacel,Boostrix) 11/17/2016

## 2025-02-19 LAB
ENA SM IGG SER IA-ACNC: <0.7 U/ML
ENA SM IGG SER IA-ACNC: NEGATIVE
ENA SS-A AB SER IA-ACNC: <0.5 U/ML
ENA SS-A AB SER IA-ACNC: NEGATIVE
ENA SS-B IGG SER IA-ACNC: <0.6 U/ML
ENA SS-B IGG SER IA-ACNC: NEGATIVE
MAYO MISC RESULT: NORMAL
U1 SNRNP IGG SER IA-ACNC: 2 U/ML
U1 SNRNP IGG SER IA-ACNC: NEGATIVE

## 2025-02-19 NOTE — TELEPHONE ENCOUNTER
Pre assessment completed for upcoming procedure.   (Please see previous telephone encounter notes for complete details)    Patient returned call.       Procedure details:    Approximate time and facility location reviewed.   Patient is aware that endoscopy team will be calling about 2 days prior to confirm arrival time.    Designated  policy reviewed and that site requests drivers to check in and stay on campus. Instructed to have someone stay 6  hours post procedure.   *Disclaimer - please notify the [facility] RN GI staff with any  issues/concerns.    Medication review:    Medications reviewed. Please see supporting documentation below. Holding recommendations discussed (if applicable).       Prep for procedure:     Procedure prep instructions reviewed.        Any additional information needed:  N/A      Patient verbalized understanding and had no questions or concerns at this time.      Mili Braun LPN  Endoscopy Procedure Pre Assessment   [phone] option 3

## 2025-02-21 LAB
CENTROMERE B AB SER-ACNC: <0.7 U/ML
CENTROMERE B AB SER-ACNC: NEGATIVE
ENA SCL70 IGG SER IA-ACNC: <0.6 U/ML
ENA SCL70 IGG SER IA-ACNC: NEGATIVE

## 2025-02-24 ENCOUNTER — TELEPHONE (OUTPATIENT)
Dept: GASTROENTEROLOGY | Facility: CLINIC | Age: 35
End: 2025-02-24
Payer: COMMERCIAL

## 2025-02-24 NOTE — TELEPHONE ENCOUNTER
Caller: Thea Draper   Reason for Reschedule/Cancellation (please be detailed, any staff messages or encounters to note?):     Per pt -- no childcare     Did you cancel or rescheduled an EUS procedure? No.    Is screening questionnaire older than 3 months from the reschedule date.   If Yes, please complete screening questionnaire. No    Prior to reschedule please review:  Ordering Provider:    Chinmay Almanzar PA-C i     Sedation Determined: moderate   Does patient have any ASC Exclusions, please identify?: n       Notes on Cancelled Procedure:  Procedure:Lower Endoscopy [Colonoscopy]   Date: 02/26/2025  Location:[facility]; [address] [place] [address], [address] Floor, [place] [address]  Surgeon: Monique         Rescheduled: no - pt will call back to r/s

## 2025-03-10 ENCOUNTER — VIRTUAL VISIT (OUTPATIENT)
Dept: RHEUMATOLOGY | Facility: CLINIC | Age: 35
End: 2025-03-10
Payer: COMMERCIAL

## 2025-03-10 VITALS — BODY MASS INDEX: 22.2 KG/M2 | HEIGHT: 64 IN | WEIGHT: 130 LBS

## 2025-03-10 DIAGNOSIS — M79.7 FIBROMYALGIA: Primary | ICD-10-CM

## 2025-03-10 PROCEDURE — 98005 SYNCH AUDIO-VIDEO EST LOW 20: CPT | Performed by: INTERNAL MEDICINE

## 2025-03-10 ASSESSMENT — PAIN SCALES - GENERAL: PAINLEVEL_OUTOF10: MODERATE PAIN (5)

## 2025-03-10 NOTE — NURSING NOTE
Current patient location: [address] [place] [id number]    Is the patient currently in the state of [place]? YES    Visit mode: VIDEO    If the visit is dropped, the patient can be reconnected by:VIDEO VISIT: Text to cell phone:   Telephone Information:   Mobile [phone]       Will anyone else be joining the visit? NO  (If patient encounters technical issues they should call [phone]    Are changes needed to the allergy or medication list? Pt stated no med changes    Are refills needed on medications prescribed by this physician? NO    Rooming Documentation:  Not applicable    Reason for visit: RECHECK    No other vitals to report per pt    Brenda Morgan VVF

## 2025-03-10 NOTE — PROGRESS NOTES
"Virtual Visit Details    Type of service:  Video Visit   Video Start Time: 10:24 AM  Video End Time: 10:30 AM    Originating Location (pt. Location): Home    Distant Location (provider location):  Off-site  Platform used for Video Visit: [facility]      Assessment and Plan      1.  Fibromyalgia    2.  No evidence of lupus    We offered, in case she is interested, a 3-month trial with a safe DMARD medication like hydroxychloroquine.  However Thea does not want to start another medication at this point, given her normal serologies that is reasonable.    Follow-up as needed.    CC PCP          Rheumatology follow-up visit note         HPI    Thea was seen previously for joint and muscle pain, she had features of fibromyalgia, did not have any joint swelling.  She had a borderline ISAURA, all specific serologies for lupus were normal.  She grew up in [place], spent some time in [place] before moving to the [facility].  She has a history of femoral acetabular impingement of the hips diagnosed since childhood.  She endorsed soreness with intermittent swelling of her fingers, toes and has a history of Raynaud's for a number of years.    We did a thorough rheumatologic workup and all her specific serologies for lupus, Sjogren's, mixed connective tissue etc. came back normal.          Vitals:    03/10/25 0955   Weight: 59 kg (130 lb)   Height: 1.626 m (5' 4\")     Alert oriented. Head including the face is examined for malar rash    Due to the virtual nature of the visit a detailed physical exam is not feasible     Patient Active Problem List    Diagnosis Date Noted    Controlled substance agreement signed 01/28/2025     Priority: Medium    Nausea 11/12/2024     Priority: Medium    Bilateral hip pain 11/12/2024     Priority: Medium    Attention deficit hyperactivity disorder (ADHD), unspecified ADHD type 11/12/2024     Priority: Medium     [facility]  Office visits every 6 months or with dose change  Had " testing done through [facility] in [place]  Controlled substance signed 2025      Autism spectrum disorder 2024     Priority: Medium    Hypermobile Haja-Danlos syndrome 2024     Priority: Medium    Mixed obsessional thoughts and acts 2024     Priority: Medium    Family history of melanoma 2024     Priority: Medium    Breast pain 2024     Priority: Medium    POTS (postural orthostatic tachycardia syndrome) 2022     Priority: Medium     Past Surgical History:   Procedure Laterality Date     SECTION      x3    OTHER SURGICAL HISTORY      r breast benign tumor removed, has a maker here.      No past medical history on file.  Allergies   Allergen Reactions    Latex Unknown     Itchy and rash     Current Outpatient Medications   Medication Sig Dispense Refill    azelaic acid (FINACIA) 15 % external gel Apply 1-2 times daily for rosacea and facial irritation. 50 g 3    bisacodyl (DULCOLAX) 5 MG EC tablet Two days prior to exam take two (2) tablets at 4pm. One day prior to exam take two (2) tablets at 4pm 4 tablet 0    lisdexamfetamine (VYVANSE) 40 MG capsule Take 1 capsule (40 mg) by mouth daily. 30 capsule 0    [START ON 2025] lisdexamfetamine (VYVANSE) 40 MG capsule Take 1 capsule (40 mg) by mouth daily. 30 capsule 0    norelgestromin-ethinyl estradiol (ZAFEMY) 150-35 MCG/24HR patch Place 1 patch onto the skin once a week.      polyethylene glycol (GOLYTELY) 236 g suspension Two days before procedure at 5PM fill first container with water. Mix and drink an 8 oz glass every 15 minutes until HALF of the container is gone. Place the remainder in the refrigerator. One day before procedure at 5PM drink second half of bowel prep. Drink an 8 oz glass every 15 minutes until it is gone. Day of procedure 6 hours before arrival time fill the 2nd container with water. Mix and drink an 8 oz glass every 15 minutes until HALF of the container is gone. Discard the remaining solution.  8000 mL 0     family history includes Melanoma in her mother.  Social Connections: Unknown (11/12/2024)    Social Connection and Isolation Panel [NHANES]     Frequency of Communication with Friends and Family: Not on file     Frequency of Social Gatherings with Friends and Family: Never     Attends Mandaen Services: Not on file     Active Member of Clubs or Organizations: Not on file     Attends Club or Organization Meetings: Not on file     Marital Status: Not on file          WBC Count   Date Value Ref Range Status   11/12/2024 8.8 4.0 - 11.0 10e3/uL Final     RBC Count   Date Value Ref Range Status   11/12/2024 4.46 3.80 - 5.20 10e6/uL Final     Hemoglobin   Date Value Ref Range Status   11/12/2024 13.0 11.7 - 15.7 g/dL Final     Hematocrit   Date Value Ref Range Status   11/12/2024 39.5 35.0 - 47.0 % Final     MCV   Date Value Ref Range Status   11/12/2024 89 78 - 100 fL Final     MCH   Date Value Ref Range Status   11/12/2024 29.1 26.5 - 33.0 pg Final     Platelet Count   Date Value Ref Range Status   11/12/2024 204 150 - 450 10e3/uL Final     % Lymphocytes   Date Value Ref Range Status   11/12/2024 23 % Final     AST   Date Value Ref Range Status   01/23/2025 16 0 - 45 U/L Final     ALT   Date Value Ref Range Status   01/23/2025 14 0 - 50 U/L Final     Albumin   Date Value Ref Range Status   01/23/2025 4.3 3.5 - 5.2 g/dL Final     Alkaline Phosphatase   Date Value Ref Range Status   01/23/2025 101 40 - 150 U/L Final     Creatinine   Date Value Ref Range Status   11/12/2024 0.77 0.51 - 0.95 mg/dL Final     GFR Estimate   Date Value Ref Range Status   11/12/2024 >90 >60 mL/min/1.73m2 Final     Comment:     eGFR calculated using 2021 CKD-EPI equation.

## 2025-05-18 ENCOUNTER — MYC REFILL (OUTPATIENT)
Dept: FAMILY MEDICINE | Facility: CLINIC | Age: 35
End: 2025-05-18
Payer: COMMERCIAL

## 2025-05-18 DIAGNOSIS — N64.4 BREAST PAIN: Primary | ICD-10-CM

## 2025-05-19 RX ORDER — NORELGESTROMIN AND ETHINYL ESTRADIOL 35; 150 UG/MG; UG/MG
1 PATCH TRANSDERMAL WEEKLY
Qty: 12 PATCH | Refills: 1 | Status: SHIPPED | OUTPATIENT
Start: 2025-05-19

## 2025-06-23 ENCOUNTER — TELEPHONE (OUTPATIENT)
Dept: DERMATOLOGY | Facility: CLINIC | Age: 35
End: 2025-06-23

## 2025-06-23 NOTE — TELEPHONE ENCOUNTER
6/23 Left Voicemail (1st Attempt) and Sent Mychart (1st Attempt) for the patient to call back and schedule the following:    Appointment type: Return Dermatology  Provider: Any Provider  Return date: July 2025  Specialty phone number: [phone]  Additional appointment(s) needed: na  Additonal Notes: Reschedule for 07/02/2025 appt with .

## 2025-07-31 ENCOUNTER — OFFICE VISIT (OUTPATIENT)
Dept: FAMILY MEDICINE | Facility: CLINIC | Age: 35
End: 2025-07-31
Payer: COMMERCIAL

## 2025-07-31 ENCOUNTER — ORDERS ONLY (AUTO-RELEASED) (OUTPATIENT)
Dept: FAMILY MEDICINE | Facility: CLINIC | Age: 35
End: 2025-07-31

## 2025-07-31 VITALS
DIASTOLIC BLOOD PRESSURE: 74 MMHG | OXYGEN SATURATION: 99 % | RESPIRATION RATE: 18 BRPM | HEIGHT: 64 IN | HEART RATE: 78 BPM | BODY MASS INDEX: 22.53 KG/M2 | TEMPERATURE: 97.2 F | SYSTOLIC BLOOD PRESSURE: 128 MMHG | WEIGHT: 132 LBS

## 2025-07-31 DIAGNOSIS — G90.A POTS (POSTURAL ORTHOSTATIC TACHYCARDIA SYNDROME): ICD-10-CM

## 2025-07-31 DIAGNOSIS — Z00.00 ROUTINE GENERAL MEDICAL EXAMINATION AT A HEALTH CARE FACILITY: Primary | ICD-10-CM

## 2025-07-31 DIAGNOSIS — D22.9 MULTIPLE NEVI: ICD-10-CM

## 2025-07-31 DIAGNOSIS — R42 DIZZINESS: ICD-10-CM

## 2025-07-31 DIAGNOSIS — R11.0 NAUSEA: ICD-10-CM

## 2025-07-31 RX ORDER — ONDANSETRON 4 MG/1
4 TABLET, ORALLY DISINTEGRATING ORAL EVERY 12 HOURS PRN
Qty: 60 TABLET | Refills: 2 | Status: SHIPPED | OUTPATIENT
Start: 2025-07-31

## 2025-07-31 SDOH — HEALTH STABILITY: PHYSICAL HEALTH: ON AVERAGE, HOW MANY DAYS PER WEEK DO YOU ENGAGE IN MODERATE TO STRENUOUS EXERCISE (LIKE A BRISK WALK)?: 3 DAYS

## 2025-07-31 ASSESSMENT — PAIN SCALES - GENERAL: PAINLEVEL_OUTOF10: NO PAIN (0)

## 2025-07-31 ASSESSMENT — SOCIAL DETERMINANTS OF HEALTH (SDOH): HOW OFTEN DO YOU GET TOGETHER WITH FRIENDS OR RELATIVES?: NEVER

## 2025-07-31 NOTE — PROGRESS NOTES
Preventive Care Visit  [facility] DALY Evans PA-C, Family Medicine  Jul 31, 2025      Assessment & Plan     Routine general medical examination at a health care facility      POTS (postural orthostatic tachycardia syndrome)  Per her apple watch had two episode of a fib this month, will get zio and refer back to cards (had cardiology in [place], previously referred due to pots)  Follow up if symptoms worsen or change. To ER with severe worsening symptoms.     - Adult Cardiology Eval  Referral; Future  - ZIO PATCH MAIL OUT; Future  - OFFICE/OUTPT VISIT,EST,LEVL IV    Dizziness    - ZIO PATCH MAIL OUT; Future  - OFFICE/OUTPT VISIT,EST,LEVL IV    Nausea  Seeing GI however nervous to get endoscopy/colonoscopy without being totally put under, she will contact them to see if this is possible. Has nausea daily. Unsure if birth control making worse, might try stopping for a month  - ondansetron (ZOFRAN ODT) 4 MG ODT tab; Take 1 tablet (4 mg) by mouth every 12 hours as needed for nausea.  - OFFICE/OUTPT VISIT,EST,LEVL IV    Multiple nevi  Continue with derm    Counseling  Appropriate preventive services were addressed with this patient via screening, questionnaire, or discussion as appropriate for fall prevention, nutrition, physical activity, Tobacco-use cessation, social engagement, weight loss and cognition.  Checklist reviewing preventive services available has been given to the patient.  Reviewed patient's diet, addressing concerns and/or questions.   She is at risk for lack of exercise and has been provided with information to increase physical activity for the benefit of her well-being.   Patient is at risk for social isolation and has been provided with information about the benefit of social connection.   The patient was instructed to see the dentist every 6 months.   Reviewed preventive health counseling, as reflected in patient instructions       Regular  "exercise    Patient Instructions   Patient Education  Preventive Care Advice   This is general advice we often give to help people stay healthy. Your care team may have specific advice just for you. Please talk to your care team about your own preventive care needs.  Lifestyle  Exercise at least 150 minutes each week (30 minutes a day, 5 days a week).  Do muscle strengthening activities 2 days a week. These help control your weight and prevent disease.  No smoking.  Wear sunscreen to prevent skin cancer.  Take time with family and friends.  Have your home tested for radon every 2 to 5 years. Radon is a colorless, odorless gas that can harm your lungs. To learn more, go to www.health.[facility].mn.us and search for \"Radon in Homes.\"  Keep guns unloaded and locked up in a safe place like a safe or gun vault, or, use a gun lock and hide the keys. Always lock away bullets separately. To learn more, visit [place].gov and search for \"safe gun storage.\"  Nutrition  Eat 5 or more servings of fruits and vegetables each day.  Try wheat bread, brown rice and whole grain pasta (instead of white bread, rice, and pasta).  Get enough calcium and vitamin D. Check the label on foods and aim for 100% of the RDA (recommended daily allowance).  Regular exams  Have a dental exam and cleaning every 6 months.  Older adults: Ask your care team how often to have memory testing.  See your health care team every year to talk about:  Any changes in your health.  Any medicines your care team has prescribed.  Preventive care, family planning, and ways to prevent chronic diseases.  Shots (vaccines)   HPV shots (up to age 26), if you've never had them before.  Hepatitis B shots (up to age 59), if you've never had them before.  COVID-19 shot: Get this shot when it's due.  Flu shot: Get a flu shot every year.  Tetanus shot: Get a tetanus shot every 10 years.  Pneumococcal, hepatitis A, and RSV shots: Ask your care team if you need these based on your " risk.  Shingles shot (for age 50 and up).  General health tests  Diabetes screening:  Starting at age 35, Get screened for diabetes at least every 3 years.  If you are younger than age 35, ask your care team if you should be screened for diabetes.  Cholesterol test: At age 39, start having a cholesterol test every 5 years, or more often if advised.  Bone density scan (DEXA): At age 50, ask your care team if you should have this scan for osteoporosis (brittle bones).  Hepatitis C: Get tested at least once in your life.  Abdominal aortic aneurysm screening: Talk to your doctor about having this screening if you:  Have ever smoked; and  Are biologically male; and  Are between the ages of 65 and 75.  STIs (sexually transmitted infections)  Before age 24: Ask your care team if you should be screened for STIs.  After age 24: Get screened for STIs if you're at risk. You are at risk for STIs (including HIV) if:  You are sexually active with more than one person.  You don't use condoms every time.  You or a partner was diagnosed with a sexually transmitted infection.  If you are at risk for HIV, ask about PrEP medicine to prevent HIV.  Get tested for HIV at least once in your life, whether you are at risk for HIV or not.  Cancer screening tests  Cervical cancer screening: If you have a cervix, begin getting regular cervical cancer screening tests at age 21. Most people who have regular screenings with normal results can stop after age 65. Talk about this with your provider.  Breast cancer scan (mammogram): If you've ever had breasts, begin having regular mammograms starting at age 40. This is a scan to check for breast cancer.  Colon cancer screening: It is important to start screening for colon cancer at age 45.  Have a colonoscopy test every 10 years (or more often if you're at risk) Or, ask your provider about stool tests like a FIT test every year or Cologuard test every 3 years.  To learn more about your testing  options, visit: www.fvfiles.com/946317.pdf.  For help making a decision, visit: brandon.shay/ye65381.  Prostate cancer screening test: If you have a prostate and are age 55 to 69, ask your provider if you would benefit from a yearly prostate cancer screening test.  Lung cancer screening: If you are a current or former smoker age 50 to 80, ask your care team if ongoing lung cancer screenings are right for you.    For informational purposes only. Not to replace the advice of your health care provider. Copyright   2023 [employer]. All rights reserved. Clinically reviewed by the [facility] Transitions Program. [employer] 064239 - REV 6/25.  Relationships for Good Health  Relationships are important for our health and happiness. Social isolation, loneliness and lack of support are bad for your health. Studies show that loneliness can harm health and limit your life span as much as high blood pressure and smoking.   Take some time to reflect on your relationships. Then answer these questions:  Are there people in your life that cause you stress or drain your energy? What can you do to set limits?  ________________________________________________________________________________________________________________________________________________________________________________________________________________________________________________________________________________________________________________________________________________  Who do you enjoy spending time with? Who can you go to for support?  ________________________________________________________________________________________________________________________________________________________________________________________________________________________________________________________________________________________________________________________________________________  What can you do to improve your relationships with  others?  __________________________________________________________________________________________________________________________________________________________________________________________________________________  ______________________________________________________________________________________________________________________________  What do you like most about your relationships with others?  ________________________________________________________________________________________________________________________________________________________________________________________________________________________________________________________________________________________________________________________________________________  My goal: ______________________________________________________________________  I will: ______________________________________________________________________________________________________________________________________________________________________________________________    For informational purposes only. Not to replace the advice of your health care provider. Copyright   2018 [facility]. All rights reserved. Clinically reviewed by Bariatric Health  Team. [employer] 033075 - Rev 06/24.  Substance Use Disorder: Care Instructions  Overview     You can improve your life and health by stopping your use of alcohol or drugs. When you don't drink or use drugs, you may feel and sleep better. You may get along better with your family, friends, and coworkers. There are medicines and programs that can help with substance use disorder.  How can you care for yourself at home?  Here are some ways to help you stay sober and prevent relapse.  If you have been given medicine to help keep you sober or reduce your cravings, be sure to take it exactly as prescribed.  Talk to your doctor about programs that can help you stop using drugs or drinking alcohol.  Do not keep  alcohol or drugs in your home.  Plan ahead. Think about what you'll say if other people ask you to drink or use drugs. Try not to spend time with people who drink or use drugs.  Use the time and money spent on drinking or drugs to do something that's important to you.  Preventing a relapse  Have a plan to deal with relapse. Learn to recognize changes in your thinking that lead you to drink or use drugs. Get help before you start to drink or use drugs again.  Try to stay away from situations, friends, or places that may lead you to drink or use drugs.  If you feel the need to drink alcohol or use drugs again, seek help right away. Call a trusted friend or family member. Some people get support from organizations such as Narcotics Anonymous or [employer] or from treatment facilities.  If you relapse, get help as soon as you can. Some people make a plan with another person that outlines what they want that person to do for them if they relapse. The plan usually includes how to handle the relapse and who to notify in case of relapse.  Don't give up. Remember that a relapse doesn't mean that you have failed. Use the experience to learn the triggers that lead you to drink or use drugs. Then quit again. Recovery is a lifelong process. Many people have several relapses before they are able to quit for good.  Follow-up care is a key part of your treatment and safety. Be sure to make and go to all appointments, and call your doctor if you are having problems. It's also a good idea to know your test results and keep a list of the medicines you take.  When should you call for help?   Call 911  anytime you think you may need emergency care. For example, call if you or someone else:    Has overdosed or has withdrawal signs. Be sure to tell the emergency workers that you are or someone else is using or trying to quit using drugs. Overdose or withdrawal signs may include:  Losing consciousness.  Seizure.  Seeing or hearing  "things that aren't there (hallucinations).     Is thinking or talking about suicide or harming others.   Where to get help 24 hours a day, 7 days a week   If you or someone you know talks about suicide, self-harm, a mental health crisis, a substance use crisis, or any other kind of emotional distress, get help right away. You can:    Call the Suicide and Crisis Lifeline at 988.     Call 9-906-429-TALK (1-757.610.2117).     Text HOME to 856323 to access the Crisis Text Line.   Consider saving these numbers in your phone.  Go to [employer] for more information or to chat online.  Call your doctor now or seek immediate medical care if:    You are having withdrawal symptoms. These may include nausea or vomiting, sweating, shakiness, and anxiety.   Watch closely for changes in your health, and be sure to contact your doctor if:    You have a relapse.     You need more help or support to stop.   Where can you learn more?  Go to https://www.[employer].net/patiented  Enter H573 in the search box to learn more about \"Substance Use Disorder: Care Instructions.\"  Current as of: August 20, 2024  Content Version: 14.5    6225-2874 [employer].   Care instructions adapted under license by your healthcare professional. If you have questions about a medical condition or this instruction, always ask your healthcare professional. [employer] disclaims any warranty or liability for your use of this information.             Rafi Sidhu is a 35 year old, presenting for the following:  Physical        7/31/2025     3:16 PM   Additional Questions   Roomed by Jaqueline GARCIA CMA   Accompanied by n/a          HPI  - Wears apple watch. Has been alerted 2x for afib. Never went to cardiology per referral made. Would like another referral.    Had fasting labs less than  a year ago.     - Would not like to do PAP today.     Chronic conditions:     SH: 4 children disabled autism, adhd, cognitive issues-stays at home " and takes care of all of them. Moved to [facility] for better schools.      Abdominal pain-seeing gi.      Zafemy patches-helps with menorrhgia.      Adhd and autism spectrum disorder diagnosed through [facility]-seen through [facility] most recently, records in care everywhere.   Has controlled substance contract with me signed in january. However patient had side effects of making her tremors worse on the med so she stopped it.   Without medication she is more scatterbrained. Can be irritible while taking it. For her ocd she has not tried medication due to having side effects with several meds.   previously used medical thc but now just otc when it became legal.  Interested in psychiatry or therapy referral? In future if needed  Denies suicidal or homicidal thoughts.  Patient instructed to go to the emergency room or call 911 if these occur.   [place]vyvanse 40 mg [place] in february, 30 mg vyvasne before that through me. No concerns seen.       H/o pots and was getting infusions for this through specialist that were helpful.      Hypermobility-saw a specialist previously. Not sure whom in [place].   Seen rheum? Never  Pt is concerned she may have lupus regardless of negative antibody testing done in November 2024. Would like to know if her symptoms could be caused by something similar. Patient has history of ocd.    Great aunt had RA. Is [facility] per patient also so concerned.    Lupus concern from patient: butterfly rash on face off and on, has h/o raynaud's, has mouth sores that come and go, history of heart murmur per patient.      ERICKSON in hips and labral tear-saw seeing ortho for this in [place]. Would like to see one here. Previously had hip injection. Referral? Wants to wait at this time.     Multiple nevus-usually sees derm yearly for a check.         7/31/2025   General Health   How would you rate your overall physical health? (!) FAIR   Feel stress (tense, anxious, or unable to sleep) Only a  little   (!) STRESS CONCERN      7/31/2025   Nutrition   Three or more servings of calcium each day? (!) NO   Diet: Other   If other, please elaborate: high salt intake   How many servings of fruit and vegetables per day? (!) 0-1   How many sweetened beverages each day? 0-1         7/31/2025   Exercise   Days per week of moderate/strenous exercise 3 days         7/31/2025   Social Factors   Frequency of gathering with friends or relatives Never   Worry food won't last until get money to buy more No   Food not last or not have enough money for food? No   Do you have housing? (Housing is defined as stable permanent housing and does not include staying outside in a car, in a tent, in an abandoned building, in an overnight shelter, or couch-surfing.) Yes   Are you worried about losing your housing? No   Lack of transportation? No   Unable to get utilities (heat,electricity)? No   (!) SOCIAL CONNECTIONS CONCERN      7/31/2025   Dental   Dentist two times every year? (!) NO           7/31/2025   Substance Use   Alcohol more than 3/day or more than 7/wk Not Applicable   Do you use any other substances recreationally? (!) CANNABIS PRODUCTS     Social History     Tobacco Use    Smoking status: Never     Passive exposure: Never    Smokeless tobacco: Never    Tobacco comments:     Cannabis, smoking and gummy   Vaping Use    Vaping status: Never Used   Substance Use Topics    Alcohol use: Not Currently    Drug use: Yes     Types: Marijuana               7/31/2025   One time HIV Screening   Previous HIV test? No         7/31/2025   STI Screening   New sexual partner(s) since last STI/HIV test? No     History of abnormal Pap smear: No - age 30-64 HPV with reflex Pap every 5 years recommended             7/31/2025   Contraception/Family Planning   Questions about contraception or family planning No   What are your periods like? Regular        Reviewed and updated as needed this visit by Provider                         Objective   "  Exam  /74   Pulse 78   Temp 97.2  F (36.2  C) (Temporal)   Resp 18   Ht 1.635 m (5' 4.37\")   Wt 59.9 kg (132 lb)   LMP 07/17/2025 (Within Weeks)   SpO2 99%   BMI 22.40 kg/m     Estimated body mass index is 22.31 kg/m  as calculated from the following:    Height as of 3/10/25: 1.626 m (5' 4\").    Weight as of 3/10/25: 59 kg (130 lb).    Physical Exam  GENERAL: alert and no distress  EYES: Eyes grossly normal to inspection, PERRL and conjunctivae and sclerae normal  HENT: ear canals and TM's normal, nose and mouth without ulcers or lesions  NECK: no adenopathy, no asymmetry, masses, or scars  RESP: lungs clear to auscultation - no rales, rhonchi or wheezes  BREAST: normal without masses, tenderness or nipple discharge and no palpable axillary masses or adenopathy  CV: regular rate and rhythm, normal S1 S2, no S3 or S4, no murmur, click or rub, no peripheral edema  ABDOMEN: soft, nontender, no hepatosplenomegaly, no masses and bowel sounds normal  MS: no gross musculoskeletal defects noted, no edema  SKIN: no suspicious lesions or rashes and multiple nevi and freckles on skin  NEURO: Normal strength and tone, mentation intact and speech normal  PSYCH: mentation appears normal, affect normal/bright        Signed Electronically by: Kathryn Evans PA-C    "

## 2025-07-31 NOTE — PATIENT INSTRUCTIONS
"Patient Education   Preventive Care Advice   This is general advice we often give to help people stay healthy. Your care team may have specific advice just for you. Please talk to your care team about your own preventive care needs.  Lifestyle  Exercise at least 150 minutes each week (30 minutes a day, 5 days a week).  Do muscle strengthening activities 2 days a week. These help control your weight and prevent disease.  No smoking.  Wear sunscreen to prevent skin cancer.  Take time with family and friends.  Have your home tested for radon every 2 to 5 years. Radon is a colorless, odorless gas that can harm your lungs. To learn more, go to www.health.[facility].mn.us and search for \"Radon in Homes.\"  Keep guns unloaded and locked up in a safe place like a safe or gun vault, or, use a gun lock and hide the keys. Always lock away bullets separately. To learn more, visit Definition 6.[place].gov and search for \"safe gun storage.\"  Nutrition  Eat 5 or more servings of fruits and vegetables each day.  Try wheat bread, brown rice and whole grain pasta (instead of white bread, rice, and pasta).  Get enough calcium and vitamin D. Check the label on foods and aim for 100% of the RDA (recommended daily allowance).  Regular exams  Have a dental exam and cleaning every 6 months.  Older adults: Ask your care team how often to have memory testing.  See your health care team every year to talk about:  Any changes in your health.  Any medicines your care team has prescribed.  Preventive care, family planning, and ways to prevent chronic diseases.  Shots (vaccines)   HPV shots (up to age 26), if you've never had them before.  Hepatitis B shots (up to age 59), if you've never had them before.  COVID-19 shot: Get this shot when it's due.  Flu shot: Get a flu shot every year.  Tetanus shot: Get a tetanus shot every 10 years.  Pneumococcal, hepatitis A, and RSV shots: Ask your care team if you need these based on your risk.  Shingles shot (for age 50 and " up).  General health tests  Diabetes screening:  Starting at age 35, Get screened for diabetes at least every 3 years.  If you are younger than age 35, ask your care team if you should be screened for diabetes.  Cholesterol test: At age 39, start having a cholesterol test every 5 years, or more often if advised.  Bone density scan (DEXA): At age 50, ask your care team if you should have this scan for osteoporosis (brittle bones).  Hepatitis C: Get tested at least once in your life.  Abdominal aortic aneurysm screening: Talk to your doctor about having this screening if you:  Have ever smoked; and  Are biologically male; and  Are between the ages of 65 and 75.  STIs (sexually transmitted infections)  Before age 24: Ask your care team if you should be screened for STIs.  After age 24: Get screened for STIs if you're at risk. You are at risk for STIs (including HIV) if:  You are sexually active with more than one person.  You don't use condoms every time.  You or a partner was diagnosed with a sexually transmitted infection.  If you are at risk for HIV, ask about PrEP medicine to prevent HIV.  Get tested for HIV at least once in your life, whether you are at risk for HIV or not.  Cancer screening tests  Cervical cancer screening: If you have a cervix, begin getting regular cervical cancer screening tests at age 21. Most people who have regular screenings with normal results can stop after age 65. Talk about this with your provider.  Breast cancer scan (mammogram): If you've ever had breasts, begin having regular mammograms starting at age 40. This is a scan to check for breast cancer.  Colon cancer screening: It is important to start screening for colon cancer at age 45.  Have a colonoscopy test every 10 years (or more often if you're at risk) Or, ask your provider about stool tests like a FIT test every year or Cologuard test every 3 years.  To learn more about your testing options, visit:  [email or web address]/[email or web address].  For help making a decision, visit: [email or web address].  Prostate cancer screening test: If you have a prostate and are age 55 to 69, ask your provider if you would benefit from a yearly prostate cancer screening test.  Lung cancer screening: If you are a current or former smoker age 50 to 80, ask your care team if ongoing lung cancer screenings are right for you.    For informational purposes only. Not to replace the advice of your health care provider. Copyright   2023 [employer]. All rights reserved. Clinically reviewed by the [facility] Transitions Program. Cm[employer] 998428 - REV 6/25.  Relationships for Good Health  Relationships are important for our health and happiness. Social isolation, loneliness and lack of support are bad for your health. Studies show that loneliness can harm health and limit your life span as much as high blood pressure and smoking.   Take some time to reflect on your relationships. Then answer these questions:  Are there people in your life that cause you stress or drain your energy? What can you do to set limits?  ________________________________________________________________________________________________________________________________________________________________________________________________________________________________________________________________________________________________________________________________________________  Who do you enjoy spending time with? Who can you go to for support?  ________________________________________________________________________________________________________________________________________________________________________________________________________________________________________________________________________________________________________________________________________________  What can you do to improve your relationships with  others?  __________________________________________________________________________________________________________________________________________________________________________________________________________________  ______________________________________________________________________________________________________________________________  What do you like most about your relationships with others?  ________________________________________________________________________________________________________________________________________________________________________________________________________________________________________________________________________________________________________________________________________________  My goal: ______________________________________________________________________  I will: ______________________________________________________________________________________________________________________________________________________________________________________________    For informational purposes only. Not to replace the advice of your health care provider. Copyright   2018 [facility]. All rights reserved. Clinically reviewed by [facility]  Team. SMART[employer] 592723 - Rev 06/24.  Substance Use Disorder: Care Instructions  Overview     You can improve your life and health by stopping your use of alcohol or drugs. When you don't drink or use drugs, you may feel and sleep better. You may get along better with your family, friends, and coworkers. There are medicines and programs that can help with substance use disorder.  How can you care for yourself at home?  Here are some ways to help you stay sober and prevent relapse.  If you have been given medicine to help keep you sober or reduce your cravings, be sure to take it exactly as prescribed.  Talk to your doctor about programs that can help you stop using drugs or drinking alcohol.  Do not keep  alcohol or drugs in your home.  Plan ahead. Think about what you'll say if other people ask you to drink or use drugs. Try not to spend time with people who drink or use drugs.  Use the time and money spent on drinking or drugs to do something that's important to you.  Preventing a relapse  Have a plan to deal with relapse. Learn to recognize changes in your thinking that lead you to drink or use drugs. Get help before you start to drink or use drugs again.  Try to stay away from situations, friends, or places that may lead you to drink or use drugs.  If you feel the need to drink alcohol or use drugs again, seek help right away. Call a trusted friend or family member. Some people get support from organizations such as Narcotics Anonymous or [employer] or from treatment facilities.  If you relapse, get help as soon as you can. Some people make a plan with another person that outlines what they want that person to do for them if they relapse. The plan usually includes how to handle the relapse and who to notify in case of relapse.  Don't give up. Remember that a relapse doesn't mean that you have failed. Use the experience to learn the triggers that lead you to drink or use drugs. Then quit again. Recovery is a lifelong process. Many people have several relapses before they are able to quit for good.  Follow-up care is a key part of your treatment and safety. Be sure to make and go to all appointments, and call your doctor if you are having problems. It's also a good idea to know your test results and keep a list of the medicines you take.  When should you call for help?   Call 911  anytime you think you may need emergency care. For example, call if you or someone else:    Has overdosed or has withdrawal signs. Be sure to tell the emergency workers that you are or someone else is using or trying to quit using drugs. Overdose or withdrawal signs may include:  Losing consciousness.  Seizure.  Seeing or hearing  "things that aren't there (hallucinations).     Is thinking or talking about suicide or harming others.   Where to get help 24 hours a day, 7 days a week   If you or someone you know talks about suicide, self-harm, a mental health crisis, a substance use crisis, or any other kind of emotional distress, get help right away. You can:    Call the Suicide and Crisis Lifeline at 988.     Call 2-030-768-TALK (1-686.134.4125).     Text HOME to 168206 to access the Crisis Text Line.   Consider saving these numbers in your phone.  Go to [employer] for more information or to chat online.  Call your doctor now or seek immediate medical care if:    You are having withdrawal symptoms. These may include nausea or vomiting, sweating, shakiness, and anxiety.   Watch closely for changes in your health, and be sure to contact your doctor if:    You have a relapse.     You need more help or support to stop.   Where can you learn more?  Go to https://www.[employer].net/patiented  Enter H573 in the search box to learn more about \"Substance Use Disorder: Care Instructions.\"  Current as of: August 20, 2024  Content Version: 14.5 2024-2025 [employer].   Care instructions adapted under license by your healthcare professional. If you have questions about a medical condition or this instruction, always ask your healthcare professional. [employer] disclaims any warranty or liability for your use of this information.       "

## 2025-08-30 LAB — CV ZIO PRELIM RESULTS: NORMAL

## 2025-09-03 ENCOUNTER — OFFICE VISIT (OUTPATIENT)
Dept: DERMATOLOGY | Facility: CLINIC | Age: 35
End: 2025-09-03
Attending: STUDENT IN AN ORGANIZED HEALTH CARE EDUCATION/TRAINING PROGRAM
Payer: COMMERCIAL

## 2025-09-03 DIAGNOSIS — D18.01 CHERRY ANGIOMA: ICD-10-CM

## 2025-09-03 DIAGNOSIS — D22.9 MULTIPLE MELANOCYTIC NEVI: ICD-10-CM

## 2025-09-03 DIAGNOSIS — L82.1 SEBORRHEIC KERATOSIS: ICD-10-CM

## 2025-09-03 DIAGNOSIS — L57.0 ACTINIC KERATOSIS: ICD-10-CM

## 2025-09-03 DIAGNOSIS — D48.5 NEOPLASM OF UNCERTAIN BEHAVIOR OF SKIN: ICD-10-CM

## 2025-09-03 DIAGNOSIS — L81.4 SOLAR LENTIGO: ICD-10-CM

## 2025-09-03 DIAGNOSIS — L30.9 DERMATITIS: Primary | ICD-10-CM

## 2025-09-03 RX ORDER — TACROLIMUS 1 MG/G
OINTMENT TOPICAL 2 TIMES DAILY
Qty: 100 G | Refills: 11 | Status: SHIPPED | OUTPATIENT
Start: 2025-09-03

## 2025-09-03 ASSESSMENT — PAIN SCALES - GENERAL: PAINLEVEL_OUTOF10: MODERATE PAIN (6)
